# Patient Record
Sex: FEMALE | Race: WHITE | NOT HISPANIC OR LATINO | Employment: FULL TIME | URBAN - METROPOLITAN AREA
[De-identification: names, ages, dates, MRNs, and addresses within clinical notes are randomized per-mention and may not be internally consistent; named-entity substitution may affect disease eponyms.]

---

## 2017-02-21 ENCOUNTER — HOSPITAL ENCOUNTER (OUTPATIENT)
Dept: MAMMOGRAPHY | Facility: CLINIC | Age: 42
Discharge: HOME | End: 2017-02-21
Attending: OBSTETRICS & GYNECOLOGY | Admitting: OBSTETRICS & GYNECOLOGY
Payer: COMMERCIAL

## 2017-02-21 DIAGNOSIS — Z12.31 VISIT FOR SCREENING MAMMOGRAM: ICD-10-CM

## 2017-02-21 PROCEDURE — G0202 SCR MAMMO BI INCL CAD: HCPCS

## 2017-04-02 ENCOUNTER — OFFICE VISIT (OUTPATIENT)
Dept: URGENT CARE | Facility: URGENT CARE | Age: 42
End: 2017-04-02
Payer: COMMERCIAL

## 2017-04-02 VITALS
HEART RATE: 72 BPM | WEIGHT: 170.1 LBS | OXYGEN SATURATION: 100 % | SYSTOLIC BLOOD PRESSURE: 116 MMHG | BODY MASS INDEX: 26.64 KG/M2 | DIASTOLIC BLOOD PRESSURE: 72 MMHG | TEMPERATURE: 97.5 F

## 2017-04-02 DIAGNOSIS — J02.9 ACUTE PHARYNGITIS, UNSPECIFIED: Primary | ICD-10-CM

## 2017-04-02 DIAGNOSIS — H10.9 CONJUNCTIVITIS, BACTERIAL: ICD-10-CM

## 2017-04-02 LAB
DEPRECATED S PYO AG THROAT QL EIA: NORMAL
MICRO REPORT STATUS: NORMAL
SPECIMEN SOURCE: NORMAL

## 2017-04-02 PROCEDURE — 87880 STREP A ASSAY W/OPTIC: CPT | Performed by: PHYSICIAN ASSISTANT

## 2017-04-02 PROCEDURE — 87081 CULTURE SCREEN ONLY: CPT | Performed by: PHYSICIAN ASSISTANT

## 2017-04-02 PROCEDURE — 99213 OFFICE O/P EST LOW 20 MIN: CPT | Performed by: PHYSICIAN ASSISTANT

## 2017-04-02 RX ORDER — TOBRAMYCIN 3 MG/ML
1 SOLUTION/ DROPS OPHTHALMIC 4 TIMES DAILY
COMMUNITY
End: 2018-04-19

## 2017-04-02 NOTE — NURSING NOTE
"Chief Complaint   Patient presents with     Conjunctivitis     pink eye follow up from [facility] clinic from 3/31/17 symptoms are worse and spread to both eye and now sore throat        Initial /72 (BP Location: Right arm, Cuff Size: Adult Regular)  Pulse 72  Temp 97.5  F (36.4  C)  Wt 170 lb 1.6 oz (77.2 kg)  SpO2 100%  Breastfeeding? No  BMI 26.64 kg/m2 Estimated body mass index is 26.64 kg/(m^2) as calculated from the following:    Height as of 4/1/16: 5' 7\" (1.702 m).    Weight as of this encounter: 170 lb 1.6 oz (77.2 kg).  Medication Reconciliation: complete   Holly Harden MA      "

## 2017-04-02 NOTE — PROGRESS NOTES
SUBJECTIVE:    Janeth Badillo is a 41 year old female with a chief complaint of sore throat.  Onset of symptoms was 1 week(s) ago.    Course of illness: sudden onset, still present and constant.  Severity moderate  Current and Associated symptoms: nasal congestion, rhinorrhea, cough , sore throat, headache, myalgias and coryza  Treatment measures tried include 3 day history of using topical tobramycin for conjunctivitis.  She is still having watery discharge and redness. Eye symptoms started 3/31 and are worse  Predisposing factors include HX of asthma and using topical antibiotic.    Past Medical History:   Diagnosis Date     Allergic state      Asthma      Cough, persistent     right sided rib pain due to cough     PVC's (premature ventricular contractions)      Current Outpatient Prescriptions   Medication Sig Dispense Refill     Coenzyme Q10 (CO Q 10 PO)        tobramycin (TOBREX) 0.3 % ophthalmic solution 1 drop 4 times daily       Prenatal Multivit-Min-Fe-FA (PRENATAL VITAMINS) 0.8 MG TABS Take 1 Dose by mouth daily 100 tablet 3     Cholecalciferol (VITAMIN D) 2000 UNITS tablet Take 2,000 Units by mouth daily 100 tablet 3     Calcium Carbonate-Vitamin D (CALCIUM + D PO) Take 1 tablet by mouth       ALBUTEROL 90 MCG/ACT IN AERS 1-2 puffs Q 4-6 hrs prn 1 11     Social History   Substance Use Topics     Smoking status: Never Smoker     Smokeless tobacco: Never Used     Alcohol use Yes      Comment: 1-2/WEEK       ROS:  Review of systems negative except as stated above.    OBJECTIVE:   /72 (BP Location: Right arm, Cuff Size: Adult Regular)  Pulse 72  Temp 97.5  F (36.4  C)  Wt 170 lb 1.6 oz (77.2 kg)  SpO2 100%  Breastfeeding? No  BMI 26.64 kg/m2  GENERAL APPEARANCE: healthy, alert and no distress  EYES: EOMI,  PERRL, conjunctiva erythematous bilaterally. No pre or post auricular lymphadenopathy  HENT: ear canals and TM's normal.  Nose normal.  Pharynx erythematous with some exudate noted.  NECK:  supple, non-tender to palpation, no adenopathy noted  RESP: lungs clear to auscultation - no rales, rhonchi or wheezes  CV: regular rates and rhythm, normal S1 S2, no murmur noted  ABDOMEN:  soft, nontender, no HSM or masses and bowel sounds normal  SKIN: no suspicious lesions or rashes    Rapid Strep test is negative; await throat culture results.    ASSESSMENT:   Acute pharyngitis, unspecified    PLAN:     Continue with topical eye antibiotic.   Symptomatic treat with gargles, lozenges, and OTC analgesic as needed.   Follow-up with primary clinic if not improving.  Indication for return was gone over and patient voiced understanding.

## 2017-04-04 LAB
BACTERIA SPEC CULT: NORMAL
MICRO REPORT STATUS: NORMAL
SPECIMEN SOURCE: NORMAL

## 2017-04-17 ENCOUNTER — TELEPHONE (OUTPATIENT)
Dept: FAMILY MEDICINE | Facility: CLINIC | Age: 42
End: 2017-04-17

## 2017-04-17 NOTE — LETTER
[facility]  [address] [place] [id number]  [phone]        April 17, 2017      Janeth Badillo  [address] [facility] [address]          Dear Janeth,    In order to ensure we are providing the best quality care, we have reviewed your chart and see that you are due for:    1. Pap smear  2. Asthma follow up    Please call the clinic at your earliest convenience to schedule an appointment.  If you have completed these please contact our office via phone or [email or web address] to update our records.  We would like to know the date (approximately month and year), the result, and ideally where the procedure was performed.    Thank you for trusting us with your health care.      Sincerely,    Care Team for LENORA Lozano

## 2017-04-17 NOTE — TELEPHONE ENCOUNTER
Panel Management Review      Patient has the following on her problem list:     Asthma review     ACT Total Scores 4/1/2016   ACT TOTAL SCORE (Goal Greater than or Equal to 20) 22   In the past 12 months, how many times did you visit the emergency room for your asthma without being admitted to the hospital? 0   In the past 12 months, how many times were you hospitalized overnight because of your asthma? 0      1. Is Asthma diagnosis on the Problem List? Yes    2. Is Asthma listed on Health Maintenance? Yes    3. Patient is due for:  ACT and AAP      Composite cancer screening  Chart review shows that this patient is due/due soon for the following Pap Smear  Summary:    Patient is due/failing the following:   AAP, ACT and PAP    Action needed:   Patient needs office visit for pap smear and asthma follow up.    Type of outreach:    Sent [employer] message. and Sent letter.    Questions for provider review:    None                                                                                                                                    Dutch Swanson MA     Chart routed to none.

## 2017-05-10 ENCOUNTER — APPOINTMENT (OUTPATIENT)
Dept: LAB | Facility: CLINIC | Age: 42
End: 2017-05-10
Payer: COMMERCIAL

## 2017-05-10 PROCEDURE — 84999 UNLISTED CHEMISTRY PROCEDURE: CPT | Performed by: OBSTETRICS & GYNECOLOGY

## 2017-05-10 PROCEDURE — 81229 CYTOG ALYS CHRML ABNR SNPCGH: CPT | Performed by: OBSTETRICS & GYNECOLOGY

## 2017-05-23 DIAGNOSIS — Z82.79 FAMILY HISTORY OF CONGENITAL OR GENETIC CONDITION: Primary | ICD-10-CM

## 2017-05-26 DIAGNOSIS — Z82.79 FAMILY HISTORY OF CONGENITAL OR GENETIC CONDITION: ICD-10-CM

## 2017-05-26 LAB — MISCELLANEOUS TEST: NORMAL

## 2017-05-26 PROCEDURE — 36415 COLL VENOUS BLD VENIPUNCTURE: CPT | Performed by: PEDIATRICS

## 2017-06-03 ENCOUNTER — HEALTH MAINTENANCE LETTER (OUTPATIENT)
Age: 42
End: 2017-06-03

## 2017-10-09 LAB
LOCATION PERFORMED: NORMAL
RESULT: NORMAL
SEND OUTS MISC TEST CODE: 561
SEND OUTS MISC TEST SPECIMEN: NORMAL
TEST NAME: NORMAL

## 2018-04-13 ENCOUNTER — HOSPITAL ENCOUNTER (OUTPATIENT)
Dept: MAMMOGRAPHY | Facility: CLINIC | Age: 43
Discharge: HOME | End: 2018-04-13
Attending: OBSTETRICS & GYNECOLOGY | Admitting: OBSTETRICS & GYNECOLOGY
Payer: COMMERCIAL

## 2018-04-13 DIAGNOSIS — Z12.31 VISIT FOR SCREENING MAMMOGRAM: ICD-10-CM

## 2018-04-13 PROCEDURE — 77063 BREAST TOMOSYNTHESIS BI: CPT

## 2018-04-18 ENCOUNTER — HOSPITAL ENCOUNTER (OUTPATIENT)
Dept: MAMMOGRAPHY | Facility: CLINIC | Age: 43
Discharge: HOME | End: 2018-04-18
Attending: OBSTETRICS & GYNECOLOGY | Admitting: OBSTETRICS & GYNECOLOGY
Payer: COMMERCIAL

## 2018-04-18 DIAGNOSIS — R92.8 ABNORMAL MAMMOGRAM: ICD-10-CM

## 2018-04-18 PROCEDURE — 76642 ULTRASOUND BREAST LIMITED: CPT | Mod: RT

## 2018-04-19 ENCOUNTER — HOSPITAL ENCOUNTER (OUTPATIENT)
Dept: MAMMOGRAPHY | Facility: CLINIC | Age: 43
End: 2018-04-19
Attending: OBSTETRICS & GYNECOLOGY
Payer: COMMERCIAL

## 2018-04-19 ENCOUNTER — HOSPITAL ENCOUNTER (OUTPATIENT)
Dept: MAMMOGRAPHY | Facility: CLINIC | Age: 43
Discharge: HOME | End: 2018-04-19
Attending: OBSTETRICS & GYNECOLOGY | Admitting: OBSTETRICS & GYNECOLOGY
Payer: COMMERCIAL

## 2018-04-19 DIAGNOSIS — R92.8 ABNORMAL MAMMOGRAM: ICD-10-CM

## 2018-04-19 PROCEDURE — 88305 TISSUE EXAM BY PATHOLOGIST: CPT | Performed by: RADIOLOGY

## 2018-04-19 PROCEDURE — 25000125 ZZHC RX 250: Performed by: OBSTETRICS & GYNECOLOGY

## 2018-04-19 PROCEDURE — 88305 TISSUE EXAM BY PATHOLOGIST: CPT | Mod: 26 | Performed by: RADIOLOGY

## 2018-04-19 PROCEDURE — 27210206 US BREAST BIOPSY CORE NEEDLE RIGHT

## 2018-04-19 PROCEDURE — 40000986 MA POST PROCEDURE RIGHT

## 2018-04-19 RX ADMIN — LIDOCAINE HYDROCHLORIDE 5 ML: 10 INJECTION, SOLUTION INFILTRATION; PERINEURAL at 13:26

## 2018-04-19 NOTE — DISCHARGE INSTRUCTIONS
After Your Breast Biopsy    Bleeding or bruising: Slight bruising is normal.  If you bleed through the bandage, put direct pressure on the breast.  If you are still bleeding after 20 minutes, call the doctor who ordered the exam.    Bandages: Keep your bandage in place until tomorrow morning.  Do not get it wet.  .  On the second day, cover it with a Band-Aid.    Activity: You may shower the morning after the exam.  No heavy activity (lifting, vacuuming) for 24 hours.    Discomfort: Wear your bra overnight to support the breast.  You may take Tylenol (acetaminophen) for pain.  If you had a stereotactic of MR-directed biopsy, you may take aspirin or ibuprofen (Advil, Motrin) the morning after your biopsy, unless your doctor tells you not to.    Infection: Infection is rare.  Symptoms include fever, redness, increasing pain and fluid draining from the biopsy site.  If you have any of these symptoms, please call the doctor who ordered your exam.    Results: Results may take up to three business days.  If you have not heard your results in three days, call the Breast Center Nurse at [phone] or [phone].  In rare cases, we may need to do another biopsy.    Call the doctor who ordered your exam if:    You have bleeding that lasts more than 20 minutes.    You have pain that cannot be controlled.    You have signs of infection (fever, redness, drainage or other signs).    You have not had your results within three days.    Nurse navigator: Our nurse navigator is here to answer your questions and help you set up future clinic visits.  Please call [phone].    Thank you for choosing [facility].  Please call us if you have questions or concerns about your biopsy.

## 2018-04-20 LAB — COPATH REPORT: NORMAL

## 2018-04-20 NOTE — PROGRESS NOTES
Attempted to reach Ms. Badillo ( NO answer) to give her the results of her 4/19/2018 Right Breast Biopsy ( Normal Breast Tissue).  Radiologist is recommending Annual Screening Mammogram.

## 2018-04-23 ENCOUNTER — TELEPHONE (OUTPATIENT)
Dept: MAMMOGRAPHY | Facility: CLINIC | Age: 43
End: 2018-04-23

## 2018-04-23 NOTE — TELEPHONE ENCOUNTER
After review by Breast Center Radiologist, Dr. Obi Steward, Ms. Badillo was called and given her 4/19/2018 Right Breast Biopsy results (Normal Breast Tissue) and recommended Follow up (Annual Screening).  Biopsy site without issues or concerns.  I encouraged her to perform monthly breast self exams and to contact her doctor with any further breast concerns.

## 2018-05-16 ENCOUNTER — SURGERY (OUTPATIENT)
Age: 43
End: 2018-05-16

## 2018-05-16 ENCOUNTER — HOSPITAL ENCOUNTER (OUTPATIENT)
Facility: CLINIC | Age: 43
Discharge: HOME | End: 2018-05-16
Attending: OBSTETRICS & GYNECOLOGY | Admitting: OBSTETRICS & GYNECOLOGY
Payer: COMMERCIAL

## 2018-05-16 VITALS
RESPIRATION RATE: 16 BRPM | WEIGHT: 160.4 LBS | HEART RATE: 77 BPM | OXYGEN SATURATION: 99 % | TEMPERATURE: 98.5 F | BODY MASS INDEX: 25.18 KG/M2 | HEIGHT: 67 IN | SYSTOLIC BLOOD PRESSURE: 122 MMHG | DIASTOLIC BLOOD PRESSURE: 80 MMHG

## 2018-05-16 PROBLEM — N92.0 MENORRHAGIA: Chronic | Status: ACTIVE | Noted: 2018-05-16

## 2018-05-16 LAB — B-HCG SERPL-ACNC: <1 IU/L (ref 0–5)

## 2018-05-16 PROCEDURE — 36415 COLL VENOUS BLD VENIPUNCTURE: CPT | Performed by: OBSTETRICS & GYNECOLOGY

## 2018-05-16 PROCEDURE — 84702 CHORIONIC GONADOTROPIN TEST: CPT | Performed by: OBSTETRICS & GYNECOLOGY

## 2018-05-16 PROCEDURE — 40000883 ZZH CANCELLED SURGERY UP TO 61-90 MINS: Performed by: OBSTETRICS & GYNECOLOGY

## 2018-05-16 RX ORDER — LEVALBUTEROL TARTRATE 45 UG/1
2 AEROSOL, METERED ORAL EVERY 4 HOURS PRN
COMMUNITY
End: 2022-08-10

## 2018-05-16 RX ORDER — LEVOCETIRIZINE DIHYDROCHLORIDE 5 MG/1
5 TABLET, FILM COATED ORAL EVERY EVENING
COMMUNITY
End: 2022-08-10

## 2018-05-16 NOTE — OR NURSING
Surgery cancelled per SHASHI, Dr. Sarabia due to heart history. She will follow up with her cardiologist. Dr. Guevara here and talking with patient also. Patient in preop for 1 hr 45 min.

## 2019-06-05 ENCOUNTER — HOSPITAL ENCOUNTER (OUTPATIENT)
Dept: MAMMOGRAPHY | Facility: CLINIC | Age: 44
Discharge: HOME | End: 2019-06-05
Attending: OBSTETRICS & GYNECOLOGY | Admitting: OBSTETRICS & GYNECOLOGY
Payer: COMMERCIAL

## 2019-06-05 DIAGNOSIS — Z12.31 VISIT FOR SCREENING MAMMOGRAM: ICD-10-CM

## 2019-06-05 PROCEDURE — 77063 BREAST TOMOSYNTHESIS BI: CPT

## 2019-09-29 ENCOUNTER — HEALTH MAINTENANCE LETTER (OUTPATIENT)
Age: 44
End: 2019-09-29

## 2020-01-07 ENCOUNTER — MEDICAL CORRESPONDENCE (OUTPATIENT)
Dept: HEALTH INFORMATION MANAGEMENT | Facility: CLINIC | Age: 45
End: 2020-01-07

## 2020-01-23 DIAGNOSIS — L65.9 BALDNESS: Primary | ICD-10-CM

## 2020-01-23 LAB
FERRITIN SERPL-MCNC: 55 NG/ML (ref 8–252)
FOLATE SERPL-MCNC: 16.8 NG/ML
TSH SERPL DL<=0.005 MIU/L-ACNC: 0.77 MU/L (ref 0.4–4)
VIT B12 SERPL-MCNC: 453 PG/ML (ref 193–986)

## 2020-01-23 PROCEDURE — 36415 COLL VENOUS BLD VENIPUNCTURE: CPT

## 2020-01-23 PROCEDURE — 84270 ASSAY OF SEX HORMONE GLOBUL: CPT

## 2020-01-23 PROCEDURE — 82607 VITAMIN B-12: CPT

## 2020-01-23 PROCEDURE — 82627 DEHYDROEPIANDROSTERONE: CPT

## 2020-01-23 PROCEDURE — 82728 ASSAY OF FERRITIN: CPT

## 2020-01-23 PROCEDURE — 84443 ASSAY THYROID STIM HORMONE: CPT

## 2020-01-23 PROCEDURE — 86038 ANTINUCLEAR ANTIBODIES: CPT

## 2020-01-23 PROCEDURE — 82746 ASSAY OF FOLIC ACID SERUM: CPT

## 2020-01-23 PROCEDURE — 86039 ANTINUCLEAR ANTIBODIES (ANA): CPT

## 2020-01-23 PROCEDURE — 84630 ASSAY OF ZINC: CPT

## 2020-01-23 PROCEDURE — 82306 VITAMIN D 25 HYDROXY: CPT

## 2020-01-23 PROCEDURE — 82157 ASSAY OF ANDROSTENEDIONE: CPT

## 2020-01-23 PROCEDURE — 84403 ASSAY OF TOTAL TESTOSTERONE: CPT

## 2020-01-24 LAB
ANA PAT SER IF-IMP: ABNORMAL
ANA SER QL IF: ABNORMAL
ANA TITR SER IF: ABNORMAL {TITER}
DEPRECATED CALCIDIOL+CALCIFEROL SERPL-MC: 53 UG/L (ref 20–75)
DHEA-S SERPL-MCNC: 78 UG/DL (ref 35–430)

## 2020-01-25 LAB
SHBG SERPL-SCNC: 334 NMOL/L (ref 30–135)
TESTOST FREE SERPL-MCNC: 0.02 NG/DL (ref 0.11–0.58)
TESTOST SERPL-MCNC: 13 NG/DL (ref 8–60)
ZINC SERPL-MCNC: 64.7 UG/DL (ref 60–120)

## 2020-01-26 LAB — ANDROST SERPL-MCNC: 0.3 NG/ML (ref 0.13–0.82)

## 2020-08-04 ENCOUNTER — HOSPITAL ENCOUNTER (OUTPATIENT)
Dept: MAMMOGRAPHY | Facility: CLINIC | Age: 45
Discharge: HOME | End: 2020-08-04
Attending: OBSTETRICS & GYNECOLOGY | Admitting: OBSTETRICS & GYNECOLOGY
Payer: COMMERCIAL

## 2020-08-04 DIAGNOSIS — Z12.31 VISIT FOR SCREENING MAMMOGRAM: ICD-10-CM

## 2020-08-04 PROCEDURE — 77067 SCR MAMMO BI INCL CAD: CPT

## 2021-01-14 ENCOUNTER — HEALTH MAINTENANCE LETTER (OUTPATIENT)
Age: 46
End: 2021-01-14

## 2021-05-10 ENCOUNTER — TRANSFERRED RECORDS (OUTPATIENT)
Dept: MULTI SPECIALTY CLINIC | Facility: CLINIC | Age: 46
End: 2021-05-10

## 2021-05-10 LAB
HPV ABSTRACT: NORMAL
PAP-ABSTRACT: NORMAL

## 2021-07-12 ENCOUNTER — TRANSFERRED RECORDS (OUTPATIENT)
Dept: HEALTH INFORMATION MANAGEMENT | Facility: CLINIC | Age: 46
End: 2021-07-12

## 2021-08-02 ENCOUNTER — TELEPHONE (OUTPATIENT)
Dept: CONSULT | Facility: CLINIC | Age: 46
End: 2021-08-02

## 2021-08-02 NOTE — TELEPHONE ENCOUNTER
I called Janeth to discuss a referral to genetics.  She was unavailable and a non-detailed VM was left.    Isadora Silva MS [facility]  Genetic Counselor  Email: [email or web address]  Phone: [phone]  Pager: [phone]

## 2021-08-03 ENCOUNTER — TELEPHONE (OUTPATIENT)
Dept: CONSULT | Facility: CLINIC | Age: 46
End: 2021-08-03

## 2021-08-06 ENCOUNTER — HOSPITAL ENCOUNTER (OUTPATIENT)
Dept: MAMMOGRAPHY | Facility: CLINIC | Age: 46
Discharge: HOME | End: 2021-08-06
Attending: OBSTETRICS & GYNECOLOGY | Admitting: OBSTETRICS & GYNECOLOGY
Payer: COMMERCIAL

## 2021-08-06 DIAGNOSIS — Z12.31 VISIT FOR SCREENING MAMMOGRAM: ICD-10-CM

## 2021-08-06 PROCEDURE — 77063 BREAST TOMOSYNTHESIS BI: CPT

## 2021-08-11 NOTE — TELEPHONE ENCOUNTER
2nd message left for patient to call me back directly to schedule GC/MD appt with Dr. Soto in [facility]. Will send MyChart message.

## 2021-08-12 NOTE — TELEPHONE ENCOUNTER
Appointment scheduled.     Future Appointments   Date Time Provider Department Center   9/27/2021  1:30 PM [facility]   9/27/2021  2:00 PM Shaylee Soto MD [facility]

## 2021-09-27 ENCOUNTER — OFFICE VISIT (OUTPATIENT)
Dept: PEDIATRIC CARDIOLOGY | Facility: CLINIC | Age: 46
End: 2021-09-27
Attending: GENETIC COUNSELOR, MS
Payer: COMMERCIAL

## 2021-09-27 ENCOUNTER — OFFICE VISIT (OUTPATIENT)
Dept: PEDIATRIC CARDIOLOGY | Facility: CLINIC | Age: 46
End: 2021-09-27
Attending: MEDICAL GENETICS
Payer: COMMERCIAL

## 2021-09-27 VITALS
DIASTOLIC BLOOD PRESSURE: 76 MMHG | BODY MASS INDEX: 27 KG/M2 | HEIGHT: 68 IN | WEIGHT: 178.13 LBS | SYSTOLIC BLOOD PRESSURE: 129 MMHG | HEART RATE: 84 BPM

## 2021-09-27 DIAGNOSIS — I42.2 FAMILIAL HYPERTROPHIC CARDIOMYOPATHY (H): Primary | ICD-10-CM

## 2021-09-27 PROCEDURE — 99417 PROLNG OP E/M EACH 15 MIN: CPT | Performed by: MEDICAL GENETICS

## 2021-09-27 PROCEDURE — G0463 HOSPITAL OUTPT CLINIC VISIT: HCPCS

## 2021-09-27 PROCEDURE — 96040 HC GENETIC COUNSELING, EACH 30 MINUTES: CPT | Performed by: GENETIC COUNSELOR, MS

## 2021-09-27 PROCEDURE — 99205 OFFICE O/P NEW HI 60 MIN: CPT | Performed by: MEDICAL GENETICS

## 2021-09-27 RX ORDER — DROSPIRENONE AND ETHINYL ESTRADIOL 0.03MG-3MG
1 KIT ORAL
COMMUNITY
Start: 2021-05-10 | End: 2022-08-10

## 2021-09-27 RX ORDER — KETOCONAZOLE 20 MG/ML
SHAMPOO TOPICAL
COMMUNITY
Start: 2021-02-20

## 2021-09-27 RX ORDER — ALBUTEROL SULFATE 90 UG/1
2 AEROSOL, METERED RESPIRATORY (INHALATION) PRN
COMMUNITY
Start: 2019-12-12

## 2021-09-27 RX ORDER — VERAPAMIL HYDROCHLORIDE 120 MG/1
120 TABLET, FILM COATED, EXTENDED RELEASE ORAL
COMMUNITY
Start: 2021-08-27 | End: 2023-11-17

## 2021-09-27 RX ORDER — MULTIVITAMIN WITH IRON
TABLET ORAL
COMMUNITY
End: 2022-08-10

## 2021-09-27 RX ORDER — ERGOCALCIFEROL 1.25 MG/1
CAPSULE, LIQUID FILLED ORAL
COMMUNITY
Start: 2021-02-20 | End: 2022-08-10

## 2021-09-27 RX ORDER — CETIRIZINE HYDROCHLORIDE 10 MG/1
10 TABLET ORAL
COMMUNITY

## 2021-09-27 ASSESSMENT — MIFFLIN-ST. JEOR: SCORE: 1488.25

## 2021-09-27 NOTE — LETTER
"  2021      RE: Janeth Badillo  [address] [facility] [address]           GENETICS CLINIC CONSULTATION     Name:  Janeth Badillo  :   1975  MRN:   [id number]  Date of service: Sep 27, 2021  Primary Care Provider: Shaji Guevara  Referring Provider: Shaji Guevara    Dear Dr. Shaji Guevara     We had the pleasure of seeing Janeth in Genetics Clinic today.     Reason for consultation:  A consultation in the [facility]etics Clinic was requested for Janeth, a 46 year old female, for evaluation of hypertrophic cardiomyopathy.     She also saw our genetic counselor at this visit.       History is obtained from Patient and electronic health record.    Assessment:    Ms. Janeth Badillo is a 46 year old female with hypertrophic cardiomyopathy. Family history is positive for other maternal family members with hypertrophic cardiomyopathy. Only one other family member has had genetic testing, results are not known (but \"positive\").     We discussed hypertrophic cardiomyopathy is genetically heterogeneous. The genetic heterogeneity makes it difficult to use phenotype as the sole criterion to select a definitive cause. Broad panel testing allows for an efficient evaluation of several potential genes based on a single clinical indication. Janeth has already completed a NGS panel (18 gene) testing previously. 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 We discussed in detail what a VUS means. The classification of genetic variants, based on the ACMG guidelines, is usually a five-tiered scheme which describes the quantity and quality of evidence needed to classify the variant as pathogenic, likely pathogenic, a variant of uncertain significance (VUS), likely benign, or benign. If the classification of the variant is as a VUS, it means that, at the time of interpretation, there was not sufficient evidence to determine if the variant is clearly related to disease or not.     We " discussed further investigation is recommended to determine the cause of HCM more definitely. One option could be to do targeted variant testing and ECHO for her parents/ other symptomatic and asymptomatic family members to see how this variant is segregating. Other option could be to do another HCM panel now. This is a reasonable approach as her previous testing was done almost 10 years ago. Genetics has evolved since and more genes (>30) are now known to be associated with HCM. In addition, it does not seem like del/ dup (CNV) testing was included in her prior testing. Thus, her prior testing can not be considered comprehensive test at this time. Sponsored genetic testing option also exists. This information was discussed in detail by the GC.     The rationale for a genetic evaluation is based on the goal of identifying a unifying diagnosis for a patient.  A definitive diagnosis facilitates acquisition of needed services and is helpful in many other ways for the family. Many families are greatly empowered by knowing the underlying cause of a relative s disorder. Depending on the etiology, associated medical risks may be identified that lead to screening and the potential for prevention of morbidity. Specific recurrence-risk counseling can be provided, and targeted testing of at-risk family members can be offered.    Janeth agrees it is reasonable to proceed with further genetic testing via NGS panel. Before this, we will like to obtain genetic testing records for her asymptomatic female cousin.     Once we have a more definitive genetic diagnosis for Janeth, cascade genetic testing for at risk family members will be recommended.     Plan:    1. Ordered at this visit:   No orders of the defined types were placed in this encounter.      2. Genetic testing: Prior-auth for NGS (sequencing+del/dup) cardiomyopathy panel.   3. Genetic counseling consultation with Kaci Goldstein MS, [facility] to obtain pedigree, provide  genetic counseling regarding hypertrophic cardiomyopathy and obtain consent for genetic testing  4. Follow up: Return for Follow up, with me; depending on results of genetic testing.    References:  https://www.ahajournals.org/doi/10.1161/CIRCGEN.119.205687  -----------------------------------    History of Present Illness:  Ms. Janeth Badillo is a 46 year old female with hypertrophic cardiomyopathy.     Janeth got genetic testing done in  after her mother and maternal cousin were diagnosed with HCM. She had genetic testing completed through [facility]. This included 18 genes associated with HCM. One ACTC1 variant was identified and classified as a variant of uncertain clinical significance. Our GC, Isadora Silva contacted the lab recently and the variant classification remains as is.     Was getting screening ECHO since . She has been followed by at [place]. She was diagnosed with HCM just about 4 years ago. She takes Verapamil.     Patient Active Problem List   Diagnosis     CARDIOVASCULAR SCREENING; LDL GOAL LESS THAN 160     Endometriosis     fetal VSD, antepartum     Placental abnormality in third trimester     Known or suspected fetal abnormality affecting management of mother     Abnormal findings on  screening     Chromosomal abnormality in fetus, affecting management of mother, antepartum     Encounter for triage in pregnant patient     fetal asymmetric IUGR, antepartum     S/P  section     Endometrial polyp     Status post hysteroscopic polypectomy     Mild intermittent asthma without complication     Menorrhagia--AND HX OF ANEMIA     ROS  General: Negative for unexpected weight changes, fatigue  Neuro: Negative for seizures, hypotonia  Eyes: Negative for vision problems, strabismus, eye surgery, cataract  ENT: Negative for swallowing problems, cleft lip/palate  Endocrine: Negative for diabetes, precocious puberty. Mild goiter- no  meds  Respiratory: Negative for  breathing problems, cough. Mild intermittent asthma  Cardiovascular: Negative for known heart defects, murmur  Gastrointestinal: Negative for diarrhea, constipation, vomiting  : saw OB for menorrhagia, endometriosis- s/p endometrial ablation  Musculoskeletal: Negative for joint hypermobility, swelling, pain, scoliosis  Skin: Saw rheum for a positive antinuclear antibody, in the setting of recent alopecia areata.  Hematology: Negative for excessive bleeding or bruising    Developmental/Educational History:  No delays  Completed high school and college.   Works at the [employer]    Past Medical History:  Past Medical History:   Diagnosis Date     Allergic state      Asthma      Cough, persistent     right sided rib pain due to cough     PVC's (premature ventricular contractions)      Ovarian cyst  Endometriosis  Menorrhagia     Past Surgical History:  Past Surgical History:   Procedure Laterality Date      SECTION        SECTION N/A 2014    Procedure:  SECTION;  Surgeon: Anita Blanc MD;  Location:  L+D     DILATION AND CURETTAGE, OPERATIVE HYSTEROSCOPY WITH MORCELLATOR, COMBINED N/A 2016    Procedure: COMBINED DILATION AND CURETTAGE, OPERATIVE HYSTEROSCOPY WITH MORCELLATOR;  Surgeon: Peterson Dickerson MD;  Location:  OR     EYE SURGERY       HC AMNIOCENTESIS DIAGNOSTIC  10/20/2014     HERNIA REPAIR      [facility]     LAPAROSCOPIC TUBAL DYE STUDY  2013    Procedure: LAPAROSCOPIC TUBAL DYE STUDY;  LAPAROSCOPIC BILATERAL TUBAL DYE STUDY;  Surgeon: Shaji Guevara MD;  Location: [facility]     LAPAROTOMY EXPLORATORY      for fertility       Medications:  Current Outpatient Medications   Medication Sig Dispense Refill     albuterol (PROAIR HFA/PROVENTIL HFA/VENTOLIN HFA) 108 (90 Base) MCG/ACT inhaler Inhale 2 puffs into the lungs as needed       Coenzyme Q10 (CO Q 10 PO)        drospirenone-ethinyl estradiol (RESHMA) 3-0.03 MG tablet Take 1 tablet by mouth       verapamil ER  (CALAN-SR) 120 MG CR tablet Take 120 mg by mouth       Calcium Carbonate-Vitamin D (CALCIUM + D PO) Take 1 tablet by mouth (Patient not taking: Reported on 9/27/2021)       Calcium Carbonate-Vitamin D (CALCIUM 500 + D PO) Takes 20,000 units per week (Patient not taking: Reported on 9/27/2021)       cetirizine (ZYRTEC) 10 MG tablet Take 10 mg by mouth       Cholecalciferol (VITAMIN D) 2000 UNITS tablet Take 2,000 Units by mouth daily (Patient not taking: Reported on 9/27/2021) 100 tablet 3     ketoconazole (NIZORAL) 2 % external shampoo WASH ENTIRE SCALP 2 3X WEEKLY FOR MAINTENANCE. LATHER AND LET SIT 3 5 MINUTES BEFORE RINSING.       levalbuterol (XOPENEX HFA) 45 MCG/ACT Inhaler Inhale 2 puffs into the lungs every 4 hours as needed for shortness of breath / dyspnea or wheezing       levocetirizine (XYZAL) 5 MG tablet Take 5 mg by mouth every evening       magnesium 250 MG tablet        vitamin D2 (ERGOCALCIFEROL) 96230 units (1250 mcg) capsule TAKE 1 CAPSULE BY MOUTH ONCE WEEKLY FOR 3 MONTHS.         Allergies:  Allergies   Allergen Reactions     Morphine Itching     Phenergan Fortis      Extreme anxiety     Diet:  Regular    Family History:    A detailed pedigree was obtained by the genetic counselor at the time of this appointment and is scanned into the electronic medical record. I personally reviewed and discussed the pedigree with the GC and the family and concur with the GC note. Please refer to the formal pedigree for full details.       14 y old daughter had a normal recent ECHO.     7 yo daughter has been seen in genetics/ metabolism at the [facility] by Dr. Driver, Dr. Adams, Dr. Robertson and Jacquelyn Ayoub [facility] for  hyperinsulinemic hypoglycemia and VACTERL work up (tracheoesophageal fistula and esophageal atresia, perimembranous VSD s/p patch closure, PFO, PDA, and a vertebral anomaly). Normal CGH and chromosomes. Trio Exome sequencing (completed in 2017 at [facility]) was negative (including ACMG secondary  "findings- included ACTC1 gene and colton). ACMG secondary findings reported are known or expected pathogenic variants in recommended genes.    Mother had HCM and passed away in her 70's. No genetic testing    Maternal aunt has HCM. No genetic testing. Lives in [place].     Female maternal cousin (in her 30's) has normal ECHO. But \"positive genetic testing\". She got a whole panel done, not targeted testing. She has two sons and one daughter who will get genetic testing. Lives in [place]    Male maternal cousin passed away from HCM at the age of 35 years. No genetic testing    Father is well and lives in [place]    Physical Examination:  Blood pressure 129/76, pulse 84, height 5' 7.8\" (172.2 cm), weight 178 lb 2.1 oz (80.8 kg), head circumference 59.6 cm (23.47\"), not currently breastfeeding.    Pictures taken during the visit: no     GENERAL: Healthy, alert and no distress  EYES: Eyes grossly normal to inspection.  No discharge or erythema, or obvious scleral/conjunctival abnormalities.  RESP: No audible wheeze, cough, or visible cyanosis.  No visible retractions or increased work of breathing.    SKIN: Visible skin clear. No significant rash, abnormal pigmentation or lesions.  NEURO: Cranial nerves grossly intact.  Mentation and speech appropriate for age.  PSYCH: Mentation appears normal, affect normal/bright, judgement and insight intact, normal speech and appearance well-groomed.    Genetic testing done to date:        Imaging/ procedure results:  ECHO 9/2020:  Final Impressions   1. Hypertrophic cardiomyopathy, obstructive Neutral septum, with midventricular obstruction.   2. Mid left ventricular maximal instantaneous Doppler gradient rest 12 mm Hg; Valsalva 70 mm   Hg  3. The thickest wall segment was the basal anterior septal segment and the anterior lateral   papillary muscle is also hypertrophied.   4. The septal contour was neutral.   5. The thickest segment was measured at 14 mm anterior basal segment.   6. Mitral " chordal systolic anterior motion no significant mitral regurgitation.   7. Compared to the report of 04/22/2019 the following changes have occurred: induced gradient   (Valsalva) was higher today.    EKG 9/2020  Normal sinus rhythm   Minimal voltage criteria for LVH, may be normal variant   T wave abnormality, consider inferior ischemia     Pelvic US: 5/2021  1. The endometrial stripe is of normal thickness with an irregular appearance, favored to represent postsurgical changes from prior endometrial ablation.   2. Small subserosal uterine fibroid. No conspicuous subendometrial fibroid.   3. 1.5 cm simple right ovarian cyst. No follow-up warranted.                  Thank you for allowing us to participate in the care of Janethjelani Badillo. Please do not hesitate to contact us with questions.    110 min spent on the date of the encounter in chart review, patient visit, review of tests, documentation and/or discussion with other providers about the issues documented above.         Shaylee Soto MD    Division of Genetics and Metabolism  Department of Pediatrics  [facility]    Appt     [phone]  Nurse   [phone]           Route to  Patient Care Team:  No Ref-Primary, Physician as PCP - Dominic Wade

## 2021-09-27 NOTE — PATIENT INSTRUCTIONS
Genetics  [facility]     Contact our nurse care coordinator Latonya WERNERN, RN, PHN at [phone] or send a [employer] message for any non-urgent general or medical questions.     If you had genetic testing and have further questions, please contact the genetic counselor:    Kaci Goldstein I Ph: [phone]    To schedule appointments:  Pediatric Call Center for [facility]: [phone]  Neuropsychology [phone]  Radiology/ Imaging/Echocardiogram: [phone]   Services:   [phone]     You should receive a phone call about your next appointment. If you do not receive this within two weeks of your visit, please call [phone].     If you have not already done so consider signing up for BOKU by speaking with the person at the  on your way out or go to [email or web address] to sign up online.     BOKU enables easy and confidential communication with your care team.

## 2021-09-27 NOTE — LETTER
9/27/2021      RE: Janeth Badillo  [address] [facility] [address]       Date of Service: September 27, 2021    Primary Provider: Dr. Shaji Guevara  Referring Provider: Dr. Shaji Guevara     Presenting Information:   Janeth Badillo (Sammi) is a 46-year-old female who is seen in [facility] for an initial evaluation with Dr. Soto due to her history of hypertrophic cardiomyopathy (HCM).  Vee's daughter was recently referred to our clinic for evaluation due to a family history of cardiomyopathy, and we had requested to see Vee first in order to better elucidate the family history and the genetic testing that has been done to date.    Vee is followed by cardiology at [facility] for hypertrophic cardiomyopathy.  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  In 2017, the ACTC1 variant was reclassified as a variant of uncertain significance based on 2015 ACMG variant interpretation guidelines.  We recently contacted [facility] and they confirmed that this specific variant remains a variant of uncertain significance based on current ACMG guidelines.    I met with Vee today per the request of Dr. Soto to obtain a family history, and to discuss the recommendation for further cardiomyopathy genetic testing.  Please refer to Dr. Soto's note for complete details of Vee's medical history and physical exam gathered at today's visit.     Medical History:    Hypertrophic cardiomyopathy    PVC's (premature ventricular contractions)    Asthma    Pertinent imaging:     ECHO, most recent on 09/29/2020: Final Impressions: 1) Hypertrophic cardiomyopathy, obstructive Neutral septum, with midventricular obstruction. 2) Mid left ventricular maximal instantaneous Doppler gradient rest 12 mm Hg; Valsalva 70 mm Hg.  Clip 80 & 81. 3) The thickest wall segment was the  "basal anterior septal segment and the anterior lateral papillary muscle is also hypertrophied. 4) The septal contour was neutral. 5) The thickest segment was measured at 14 mm anterior basal segment. 6) Mitral chordal systolic anterior motion no significant mitral regurgitation. 7) Compared to the report of 2019 the following changes have occurred: induced gradient (Valsalva) was higher today.    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  This variant was initially classified as pathogenic, and was then reclassified in 2017 as a variant of uncertain significance.      Family History:   A three generation pedigree was obtained today and scanned into the EMR.  The following information is significant:      Vee has two children.  Her oldest daughter, Alma, is 14 years of age and healthy.  She had a normal echocardiogram last month.  Vee's youngest daughter, Rashmi, is 6 years of age.  Rashmi has a history of hyperinsulinism, VACTERL, and mild dysmorphism.  She has had several echocardiograms due to \"holes in her heart\" that were surgically repaired.  Her last echo (reportedly normal) was in  at [facility].  Rashmi had trio whole exome sequencing in 2017 that returned as normal.      Vee has one brother who has a history of autism, cognitive disability, and type II diabetes.  He lives with their father in [place].  Vee does not believe that her brother has had an echocardiogram yet, nor has he had any genetic testing related to his neurodevelopmental delays.      Maternal family history: Vee's mother  at age 70.  She had a history of strokes, A-fib and a valve problem.  She had presumed HCM.  One of Vee's aunts, now in her 80's, has HCM, a history of strokes and had an ICD placed.  One of Vee's uncles  in his 60's and had presumed HCM.  He also had pancreatic cancer.  " This uncle had a son who  at age 35 from a sudden cardiac arrest, and was known to have had an enlarged heart.  He had presumed HCM.  This uncle's daughter had recent genetic testing and tested positive for a gene variant related to cardiomyopathy (Vee does not believe that it was the ACTC1 variant).  This cousin had a normal echocardiogram.  This cousin's children will be having testing for the gene variant.  Vee has already reached out to this cousin to try to obtain more information about her genetic testing and possibly obtain a copy of her genetic test report.  Vee's grandmother  in her 60's and had a history of strokes.      Paternal family history: Vee's father has a history of stomach cancer diagnosed in his mid 70's, as well as hypertension.  Vee has two paternal first cousins once removed who had stomach cancer at a younger age, including a male who  from cancer in his 30's-40's, and a female who was diagnosed in her 30's-40's.  Two of Vee's aunts  from breast cancer, one in her 50's and the other in her 60's-70's.  One of Vee's uncles  in childhood - unknown cause.       The family history is otherwise negative for reports of birth defects, intellectual disability, known genetic disorders, seizures, congenital vision and hearing loss, and recurrent pregnancy loss / stillbirth.      Vee's maternal ancestry is [place], [place] , [place] and [place].  Her paternal ancestry is [place]  and .  There is no known consanguinity in the family.    Discussion:   Vee Badillo is a 46-year-old-female with hypertrophic cardiomyopathy and more recently premature ventricular contractions.  Vee believes that she was the first person in the family to have genetic testing for cardiomyopathy in 2012.  We reviewed her prior genetic test results.  Testing was done at [facility] and involved analysis of 14 nuclear genes and 4 mitochondrial genes.  A heterozygous variant was  "identified in the ACTC1 gene that was initially classified as pathogenic.  In 2017 the variant was reclassified as a \"variant of uncertain significance\" based on 2015 ACMG variant interpretation guidelines, though the variant remains suspicious.  We reviewed the difference between variant classifications (pathogenic vs. variant of uncertain significance).      Based on Vee's personal and family history of HCM, her daughters are most likely at 50% risk for developing HCM.  At this point, it is difficult to know if Vee's HCM can be definitively attributed to the ACTC1 gene variant of uncertain significance.  Therefore, we would not necessarily recommended testing Vee's daughters for this \"uncertain\" gene variant.  If Vee's daughters tested positive for the ACTC1 gene variant, this would not necessarily confirm / guarantee that they are at risk for developing HCM.  Likewise, if her daughters tested negative for the variant, this would not necessarily rule out a risk for HCM.      Given the improvements in genetic testing technology as well as our knowledge of genetics over the past decade, it would be reasonable for eVe to consider further genetic testing in order to more accurately determine the cause of her HCM.  If a clear pathogenic gene variant was identified for Vee, then targeted variant testing would be available and informative for her daughters and other at risk relatives.  Current panels for cardiomyopathy offered by different labs involve analysis of ~ 100 genes, and therefore doing genetic testing now would be more comprehensive compared to 10 years ago.    Genetic Testing:  Vee expressed interest in pursing additional genetic testing for cardiomyopathy.  Current testing would involve analysis of many genes associated with nonsyndromic forms of cardiomyopathy, as well as \"syndromic\" cardiomyopathy conditions in which cardiomyopathy is a feature of the condition, and there may be additional " associated medical problems.     One option for completing this testing would be to order the sponsored gene panel through [facility] (Detect Cardiomyopathy and Arrhythmia).  [facility] offers this sponsored gene panel via their partnership with a pharmaceutical company that is trying to develop treatments for inherited cardiomyopathies.  This partnership allows the testing to be sent free of charge as the cost is covered by the pharmaceutical company doing research.  By participating in the sponsored testing program, the patient agrees to share their de-identified results with the pharmaceutical company for their continued research.  Participation in a sponsored genetic test may provide indirect benefit to patients if new therapies are developed as a result, but does involve some risk related to genetic privacy.  Patients can always decline to receive sponsored testing and they would still be eligible to receive the same or equivalent genetic testing through traditional (non-sponsored) approaches.  A non-sponsored approach would be to order a similar cardiomyopathy gene panel through [facility] or another lab, and the testing would be billed to insurance.  This option would not involve sharing of de-identified information with a pharmaceutical company.    Gene panel testing involves simultaneous analysis of a group of genes that are associated with particular symptoms or related disorders.  The lab will look through the DNA letters that make up the genes to determine if there are any errors in the sequence of the letters (misspellings), or if there are any missing or extra DNA letters.  These types of errors can disrupt a gene and cause it to not work properly.    We reviewed the benefits, limitations, and possible results from gene panel testing which can include:      Positive - a mutation(s) was identified that is known to be associated with an inherited cardiomyopathy, and is thought to explain Vee's  "features.  A positive result may provide more information on appropriate clinical management for Janeth, and may provide information on additional potential health risks associated with the diagnosis.        Negative/normal - no mutations were identified in the analyzed genes.  Genetic testing has limitations, and a negative result would not exclude a genetic cause for Vee's HCM.      Variant of uncertain significance (VUS) - a change in the DNA sequence of a particular gene was identified, but there is not enough information to determine if the DNA change is disease-causing or benign.  It is unclear if the variant is contributing to Vee's HCM.  If a variant of uncertain significance is identified, testing of other relatives may be helpful to provide additional clarification.  In most cases, identification of a VUS does not result in any clinically actionable recommendations.    As mentioned above, Vee's maternal cousin had recent genetic testing for cardiomyopathy, and Vee believes that her cousin was found to have a different gene variant.  They are planning to test this cousin's children for the variant, which suggests that the variant may be pathogenic (targeted testing of relatives is usually recommended only when a gene variant is known to be pathogenic as opposed to \"uncertain significance\").  Vee reached out to her cousin today to obtain additional information, and she will try to obtain a copy of her genetic test report.  If we are able to determine which gene change was identified in Vee's cousin, we can ensure that this gene is included in the new cardiomyopathy panel that will be ordered for Vee.    Genetic test results will influence ongoing management and surveillance for Vee.  If a clearly pathogenic or likely pathogenic gene variant is identified that is associated with a specific inherited form of cardiomyopathy, this would provide information about future prognosis, including the " need to screen for additional medical problems that may be associated with that specific condition.  Genetic results may also help to inform treatment options, and help to clarify risks to other family members.  For these reasons, this recommended genetic testing for Vee is medically necessary.      Lab results may be automatically released via [facility].  Department protocol is to hold genetic testing results until we have reviewed them. We will then contact the family directly to disclose the results and ensure they receive a copy of the report. This protocol was reviewed with the family, who were in agreement to hold the results for genetics review and direct contact.      PLAN / SUMMARY:  1. Vee expressed interest in pursuing additional genetic testing for inherited cardiomyopathies.  She is specifically interested in the Detect Cardiomyopathy and Arrhythmia sponsored panel through [facility].  Prior to initiating this testing, Vee will try to obtain more information about her cousin's testing, as this may influence testing and results for Vee.  Vee provided written informed consent for this genetic testing.    2. Once we obtain records for the cousin and solidify the testing plan, we will ask the lab to mail a test kit to Vee for sample collection.  Once testing is initiated, results should be available in about 4 weeks and will be returned by phone.    3. Further follow up will depend on Vee's genetic test results.  If Vee is found to have a clear pathogenic or likely pathogenic gene variant, then we can coordinate targeted variant testing for Vee's daughters to help clarify their HCM risk.    4. Vee was provided with my contact information and encouraged to reach out with questions or concerns.       Kaci Goldstein MS, [facility]  Licensed Genetic Counselor  [facility], [place]  [phone]      Approximate Time Spent in Consultation: 45 minutes      Kaci Goldstein GC

## 2021-09-27 NOTE — PROGRESS NOTES
Date of Service: September 27, 2021    Primary Provider: Dr. Shaji Guevara  Referring Provider: Dr. Shaji Guevara     Presenting Information:   Janeth Badillo (Sammi) is a 46-year-old female who is seen in [facility] for an initial evaluation with Dr. Soto due to her history of hypertrophic cardiomyopathy (HCM).  Vee's daughter was recently referred to our clinic for evaluation due to a family history of cardiomyopathy, and we had requested to see Vee first in order to better elucidate the family history and the genetic testing that has been done to date.    Vee is followed by cardiology at [facility] for hypertrophic cardiomyopathy.  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  In 2017, the ACTC1 variant was reclassified as a variant of uncertain significance based on 2015 ACMG variant interpretation guidelines.  We recently contacted [facility] and they confirmed that this specific variant remains a variant of uncertain significance based on current ACMG guidelines.    I met with Vee today per the request of Dr. Soto to obtain a family history, and to discuss the recommendation for further cardiomyopathy genetic testing.  Please refer to Dr. Soto's note for complete details of Vee's medical history and physical exam gathered at today's visit.     Medical History:    Hypertrophic cardiomyopathy    PVC's (premature ventricular contractions)    Asthma    Pertinent imaging:     ECHO, most recent on 09/29/2020: Final Impressions: 1) Hypertrophic cardiomyopathy, obstructive Neutral septum, with midventricular obstruction. 2) Mid left ventricular maximal instantaneous Doppler gradient rest 12 mm Hg; Valsalva 70 mm Hg.  Clip 80 & 81. 3) The thickest wall segment was the basal anterior septal segment and the anterior lateral papillary muscle is also  "hypertrophied. 4) The septal contour was neutral. 5) The thickest segment was measured at 14 mm anterior basal segment. 6) Mitral chordal systolic anterior motion no significant mitral regurgitation. 7) Compared to the report of 2019 the following changes have occurred: induced gradient (Valsalva) was higher today.    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  This variant was initially classified as pathogenic, and was then reclassified in 2017 as a variant of uncertain significance.      Family History:   A three generation pedigree was obtained today and scanned into the EMR.  The following information is significant:      Vee has two children.  Her oldest daughter, Alma, is 14 years of age and healthy.  She had a normal echocardiogram last month.  Vee's youngest daughter, Rashmi, is 6 years of age.  Rashmi has a history of hyperinsulinism, VACTERL, and mild dysmorphism.  She has had several echocardiograms due to \"holes in her heart\" that were surgically repaired.  Her last echo (reportedly normal) was in February / 2020 at [facility].  Rashmi had trio whole exome sequencing in 2017 that returned as normal.      Vee has one brother who has a history of autism, cognitive disability, and type II diabetes.  He lives with their father in [place].  Vee does not believe that her brother has had an echocardiogram yet, nor has he had any genetic testing related to his neurodevelopmental delays.      Maternal family history: Vee's mother  at age 70.  She had a history of strokes, A-fib and a valve problem.  She had presumed HCM.  One of Vee's aunts, now in her 80's, has HCM, a history of strokes and had an ICD placed.  One of Vee's uncles  in his 60's and had presumed HCM.  He also had pancreatic cancer.  This uncle had a son who  at age 35 from a sudden cardiac arrest, and was known " to have had an enlarged heart.  He had presumed HCM.  This uncle's daughter had recent genetic testing and tested positive for a gene variant related to cardiomyopathy (Vee does not believe that it was the ACTC1 variant).  This cousin had a normal echocardiogram.  This cousin's children will be having testing for the gene variant.  Vee has already reached out to this cousin to try to obtain more information about her genetic testing and possibly obtain a copy of her genetic test report.  Vee's grandmother  in her 60's and had a history of strokes.      Paternal family history: Vee's father has a history of stomach cancer diagnosed in his mid 70's, as well as hypertension.  Vee has two paternal first cousins once removed who had stomach cancer at a younger age, including a male who  from cancer in his 30's-40's, and a female who was diagnosed in her 30's-40's.  Two of Vee's aunts  from breast cancer, one in her 50's and the other in her 60's-70's.  One of Vee's uncles  in childhood - unknown cause.       The family history is otherwise negative for reports of birth defects, intellectual disability, known genetic disorders, seizures, congenital vision and hearing loss, and recurrent pregnancy loss / stillbirth.      Vee's maternal ancestry is [place], [place] , [place] and [place].  Her paternal ancestry is [place]  and .  There is no known consanguinity in the family.    Discussion:   Vee Badillo is a 46-year-old-female with hypertrophic cardiomyopathy and more recently premature ventricular contractions.  Vee believes that she was the first person in the family to have genetic testing for cardiomyopathy in .  We reviewed her prior genetic test results.  Testing was done at [facility] and involved analysis of 14 nuclear genes and 4 mitochondrial genes.  A heterozygous variant was identified in the ACTC1 gene that was initially classified as pathogenic.  In 2017  "the variant was reclassified as a \"variant of uncertain significance\" based on 2015 ACMG variant interpretation guidelines, though the variant remains suspicious.  We reviewed the difference between variant classifications (pathogenic vs. variant of uncertain significance).      Based on Vee's personal and family history of HCM, her daughters are most likely at 50% risk for developing HCM.  At this point, it is difficult to know if Vee's HCM can be definitively attributed to the ACTC1 gene variant of uncertain significance.  Therefore, we would not necessarily recommended testing Vee's daughters for this \"uncertain\" gene variant.  If Vee's daughters tested positive for the ACTC1 gene variant, this would not necessarily confirm / guarantee that they are at risk for developing HCM.  Likewise, if her daughters tested negative for the variant, this would not necessarily rule out a risk for HCM.      Given the improvements in genetic testing technology as well as our knowledge of genetics over the past decade, it would be reasonable for Vee to consider further genetic testing in order to more accurately determine the cause of her HCM.  If a clear pathogenic gene variant was identified for Vee, then targeted variant testing would be available and informative for her daughters and other at risk relatives.  Current panels for cardiomyopathy offered by different labs involve analysis of ~ 100 genes, and therefore doing genetic testing now would be more comprehensive compared to 10 years ago.    Genetic Testing:  Vee expressed interest in pursing additional genetic testing for cardiomyopathy.  Current testing would involve analysis of many genes associated with nonsyndromic forms of cardiomyopathy, as well as \"syndromic\" cardiomyopathy conditions in which cardiomyopathy is a feature of the condition, and there may be additional associated medical problems.     One option for completing this testing would be to " order the sponsored gene panel through [employer] (Detect Cardiomyopathy and Arrhythmia).  [employer] offers this sponsored gene panel via their partnership with a pharmaceutical company that is trying to develop treatments for inherited cardiomyopathies.  This partnership allows the testing to be sent free of charge as the cost is covered by the pharmaceutical company doing research.  By participating in the sponsored testing program, the patient agrees to share their de-identified results with the pharmaceutical company for their continued research.  Participation in a sponsored genetic test may provide indirect benefit to patients if new therapies are developed as a result, but does involve some risk related to genetic privacy.  Patients can always decline to receive sponsored testing and they would still be eligible to receive the same or equivalent genetic testing through traditional (non-sponsored) approaches.  A non-sponsored approach would be to order a similar cardiomyopathy gene panel through [employer] [facility] [facility] or another lab, and the testing would be billed to insurance.  This option would not involve sharing of de-identified information with a pharmaceutical company.    Gene panel testing involves simultaneous analysis of a group of genes that are associated with particular symptoms or related disorders.  The lab will look through the DNA letters that make up the genes to determine if there are any errors in the sequence of the letters (misspellings), or if there are any missing or extra DNA letters.  These types of errors can disrupt a gene and cause it to not work properly.    We reviewed the benefits, limitations, and possible results from gene panel testing which can include:      Positive - a mutation(s) was identified that is known to be associated with an inherited cardiomyopathy, and is thought to explain Vee's features.  A positive result may provide more information on appropriate clinical  "management for Janeth, and may provide information on additional potential health risks associated with the diagnosis.        Negative/normal - no mutations were identified in the analyzed genes.  Genetic testing has limitations, and a negative result would not exclude a genetic cause for Vee's HCM.      Variant of uncertain significance (VUS) - a change in the DNA sequence of a particular gene was identified, but there is not enough information to determine if the DNA change is disease-causing or benign.  It is unclear if the variant is contributing to Vee's HCM.  If a variant of uncertain significance is identified, testing of other relatives may be helpful to provide additional clarification.  In most cases, identification of a VUS does not result in any clinically actionable recommendations.    As mentioned above, Vee's maternal cousin had recent genetic testing for cardiomyopathy, and Vee believes that her cousin was found to have a different gene variant.  They are planning to test this cousin's children for the variant, which suggests that the variant may be pathogenic (targeted testing of relatives is usually recommended only when a gene variant is known to be pathogenic as opposed to \"uncertain significance\").  Vee reached out to her cousin today to obtain additional information, and she will try to obtain a copy of her genetic test report.  If we are able to determine which gene change was identified in Vee's cousin, we can ensure that this gene is included in the new cardiomyopathy panel that will be ordered for Vee.    Genetic test results will influence ongoing management and surveillance for Vee.  If a clearly pathogenic or likely pathogenic gene variant is identified that is associated with a specific inherited form of cardiomyopathy, this would provide information about future prognosis, including the need to screen for additional medical problems that may be associated with that " specific condition.  Genetic results may also help to inform treatment options, and help to clarify risks to other family members.  For these reasons, this recommended genetic testing for Vee is medically necessary.      Lab results may be automatically released via [employer].  Department protocol is to hold genetic testing results until we have reviewed them. We will then contact the family directly to disclose the results and ensure they receive a copy of the report. This protocol was reviewed with the family, who were in agreement to hold the results for genetics review and direct contact.      PLAN / SUMMARY:  1. Vee expressed interest in pursuing additional genetic testing for inherited cardiomyopathies.  She is specifically interested in the Detect Cardiomyopathy and Arrhythmia sponsored panel through [employer].  Prior to initiating this testing, Vee will try to obtain more information about her cousin's testing, as this may influence testing and results for Vee.  Vee provided written informed consent for this genetic testing.    2. Once we obtain records for the cousin and solidify the testing plan, we will ask the lab to mail a test kit to Vee for sample collection.  Once testing is initiated, results should be available in about 4 weeks and will be returned by phone.    3. Further follow up will depend on Vee's genetic test results.  If Vee is found to have a clear pathogenic or likely pathogenic gene variant, then we can coordinate targeted variant testing for Vee's daughters to help clarify their HCM risk.    4. Vee was provided with my contact information and encouraged to reach out with questions or concerns.       Kaci Goldstein MS, [facility]  Licensed Genetic Counselor  [facility]  [phone]      Approximate Time Spent in Consultation: 45 minutes

## 2021-09-27 NOTE — PROGRESS NOTES
"    GENETICS CLINIC CONSULTATION     Name:  Janeth Badillo  :   1975  MRN:   [id number]  Date of service: Sep 27, 2021  Primary Care Provider: Shaji Guevara  Referring Provider: Shaji Guevara    Dear Dr. Shaji Guevara     We had the pleasure of seeing Janeth in Genetics Clinic today.     Reason for consultation:  A consultation in the [facility] was requested for Janeth, a 46 year old female, for evaluation of hypertrophic cardiomyopathy.     She also saw our genetic counselor at this visit.       History is obtained from Patient and electronic health record.    Assessment:    Ms. Janeth Badillo is a 46 year old female with hypertrophic cardiomyopathy. Family history is positive for other maternal family members with hypertrophic cardiomyopathy. Only one other family member has had genetic testing, results are not known (but \"positive\").     We discussed hypertrophic cardiomyopathy is genetically heterogeneous. The genetic heterogeneity makes it difficult to use phenotype as the sole criterion to select a definitive cause. Broad panel testing allows for an efficient evaluation of several potential genes based on a single clinical indication. Janeth has already completed a NGS panel (18 gene) testing previously. 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 We discussed in detail what a VUS means. The classification of genetic variants, based on the ACMG guidelines, is usually a five-tiered scheme which describes the quantity and quality of evidence needed to classify the variant as pathogenic, likely pathogenic, a variant of uncertain significance (VUS), likely benign, or benign. If the classification of the variant is as a VUS, it means that, at the time of interpretation, there was not sufficient evidence to determine if the variant is clearly related to disease or not.     We discussed further investigation is recommended to determine the cause of HCM more definitely. " One option could be to do targeted variant testing and ECHO for her parents/ other symptomatic and asymptomatic family members to see how this variant is segregating. Other option could be to do another HCM panel now. This is a reasonable approach as her previous testing was done almost 10 years ago. Genetics has evolved since and more genes (>30) are now known to be associated with HCM. In addition, it does not seem like del/ dup (CNV) testing was included in her prior testing. Thus, her prior testing can not be considered comprehensive test at this time. Sponsored genetic testing option also exists. This information was discussed in detail by the GC.     The rationale for a genetic evaluation is based on the goal of identifying a unifying diagnosis for a patient.  A definitive diagnosis facilitates acquisition of needed services and is helpful in many other ways for the family. Many families are greatly empowered by knowing the underlying cause of a relative s disorder. Depending on the etiology, associated medical risks may be identified that lead to screening and the potential for prevention of morbidity. Specific recurrence-risk counseling can be provided, and targeted testing of at-risk family members can be offered.    Janeth agrees it is reasonable to proceed with further genetic testing via NGS panel. Before this, we will like to obtain genetic testing records for her asymptomatic female cousin.     Once we have a more definitive genetic diagnosis for Janeth, cascade genetic testing for at risk family members will be recommended.     Plan:    1. Ordered at this visit:   No orders of the defined types were placed in this encounter.      2. Genetic testing: Prior-auth for NGS (sequencing+del/dup) cardiomyopathy panel.   3. Genetic counseling consultation with Kaci Goldstein, MS, [facility] to obtain pedigree, provide genetic counseling regarding hypertrophic cardiomyopathy and obtain consent for genetic  testing  4. Follow up: Return for Follow up, with me; depending on results of genetic testing.    References:  https://www.ahajournals.org/doi/10.1161/CIRCGEN.[email or web address]  -----------------------------------    History of Present Illness:  Ms. Janeth Badillo is a 46 year old female with hypertrophic cardiomyopathy.     Janeth got genetic testing done in  after her mother and maternal cousin were diagnosed with HCM. She had genetic testing completed through [facility]. This included 18 genes associated with HCM. One ACTC1 variant was identified and classified as a variant of uncertain clinical significance. Our GC, Isadora Silva contacted the lab recently and the variant classification remains as is.     Was getting screening ECHO since . She has been followed by at [place] cardiology. She was diagnosed with HCM just about 4 years ago. She takes Verapamil.     Patient Active Problem List   Diagnosis     CARDIOVASCULAR SCREENING; LDL GOAL LESS THAN 160     Endometriosis     fetal VSD, antepartum     Placental abnormality in third trimester     Known or suspected fetal abnormality affecting management of mother     Abnormal findings on  screening     Chromosomal abnormality in fetus, affecting management of mother, antepartum     Encounter for triage in pregnant patient     fetal asymmetric IUGR, antepartum     S/P  section     Endometrial polyp     Status post hysteroscopic polypectomy     Mild intermittent asthma without complication     Menorrhagia--AND HX OF ANEMIA     ROS  General: Negative for unexpected weight changes, fatigue  Neuro: Negative for seizures, hypotonia  Eyes: Negative for vision problems, strabismus, eye surgery, cataract  ENT: Negative for swallowing problems, cleft lip/palate  Endocrine: Negative for diabetes, precocious puberty. Mild goiter- no  meds  Respiratory: Negative for breathing problems, cough. Mild intermittent asthma  Cardiovascular: Negative for known  heart defects, murmur  Gastrointestinal: Negative for diarrhea, constipation, vomiting  : saw OB for menorrhagia, endometriosis- s/p endometrial ablation  Musculoskeletal: Negative for joint hypermobility, swelling, pain, scoliosis  Skin: Saw rheum for a positive antinuclear antibody, in the setting of recent alopecia areata.  Hematology: Negative for excessive bleeding or bruising    Developmental/Educational History:  No delays  Completed high school and college.   Works at the [employer]    Past Medical History:  Past Medical History:   Diagnosis Date     Allergic state      Asthma      Cough, persistent     right sided rib pain due to cough     PVC's (premature ventricular contractions)      Ovarian cyst  Endometriosis  Menorrhagia     Past Surgical History:  Past Surgical History:   Procedure Laterality Date      SECTION        SECTION N/A 2014    Procedure:  SECTION;  Surgeon: Anita Blanc MD;  Location:  L+D     DILATION AND CURETTAGE, OPERATIVE HYSTEROSCOPY WITH MORCELLATOR, COMBINED N/A 2016    Procedure: COMBINED DILATION AND CURETTAGE, OPERATIVE HYSTEROSCOPY WITH MORCELLATOR;  Surgeon: Peterson Dickerson MD;  Location:  OR     EYE SURGERY       HC AMNIOCENTESIS DIAGNOSTIC  10/20/2014     HERNIA REPAIR      [facility]     LAPAROSCOPIC TUBAL DYE STUDY  2013    Procedure: LAPAROSCOPIC TUBAL DYE STUDY;  LAPAROSCOPIC BILATERAL TUBAL DYE STUDY;  Surgeon: Shaji Guevara MD;  Location: [facility]     LAPAROTOMY EXPLORATORY      for fertility       Medications:  Current Outpatient Medications   Medication Sig Dispense Refill     albuterol (PROAIR HFA/PROVENTIL HFA/VENTOLIN HFA) 108 (90 Base) MCG/ACT inhaler Inhale 2 puffs into the lungs as needed       Coenzyme Q10 (CO Q 10 PO)        drospirenone-ethinyl estradiol (RESHMA) 3-0.03 MG tablet Take 1 tablet by mouth       verapamil ER (CALAN-SR) 120 MG CR tablet Take 120 mg by mouth       Calcium Carbonate-Vitamin D  (CALCIUM + D PO) Take 1 tablet by mouth (Patient not taking: Reported on 9/27/2021)       Calcium Carbonate-Vitamin D (CALCIUM 500 + D PO) Takes 20,000 units per week (Patient not taking: Reported on 9/27/2021)       cetirizine (ZYRTEC) 10 MG tablet Take 10 mg by mouth       Cholecalciferol (VITAMIN D) 2000 UNITS tablet Take 2,000 Units by mouth daily (Patient not taking: Reported on 9/27/2021) 100 tablet 3     ketoconazole (NIZORAL) 2 % external shampoo WASH ENTIRE SCALP 2 3X WEEKLY FOR MAINTENANCE. LATHER AND LET SIT 3 5 MINUTES BEFORE RINSING.       levalbuterol (XOPENEX HFA) 45 MCG/ACT Inhaler Inhale 2 puffs into the lungs every 4 hours as needed for shortness of breath / dyspnea or wheezing       levocetirizine (XYZAL) 5 MG tablet Take 5 mg by mouth every evening       magnesium 250 MG tablet        vitamin D2 (ERGOCALCIFEROL) 16972 units (1250 mcg) capsule TAKE 1 CAPSULE BY MOUTH ONCE WEEKLY FOR 3 MONTHS.         Allergies:  Allergies   Allergen Reactions     Morphine Itching     Phenergan Fortis      Extreme anxiety     Diet:  Regular    Family History:    A detailed pedigree was obtained by the genetic counselor at the time of this appointment and is scanned into the electronic medical record. I personally reviewed and discussed the pedigree with the GC and the family and concur with the GC note. Please refer to the formal pedigree for full details.       14 y old daughter had a normal recent ECHO.     7 yo daughter has been seen in genetics/ metabolism at the [facility] by Dr. Driver, Dr. Adams, Dr. Robertson and Jacquelyn Ayoub Located within [facility] for  hyperinsulinemic hypoglycemia and VACTERL work up (tracheoesophageal fistula and esophageal atresia, perimembranous VSD s/p patch closure, PFO, PDA, and a vertebral anomaly). Normal CGH and chromosomes. Trio Exome sequencing (completed in 2017 at [facility]) was negative (including ACMG secondary findings- included ACTC1 gene and colton). ACMG secondary findings reported are known  "or expected pathogenic variants in recommended genes.    Mother had HCM and passed away in her 70's. No genetic testing    Maternal aunt has HCM. No genetic testing. Lives in [place].     Female maternal cousin (in her 30's) has normal ECHO. But \"positive genetic testing\". She got a whole panel done, not targeted testing. She has two sons and one daughter who will get genetic testing. Lives in [place]    Male maternal cousin passed away from HCM at the age of 35 years. No genetic testing    Father is well and lives in [place]    Physical Examination:  Blood pressure 129/76, pulse 84, height 5' 7.8\" (172.2 cm), weight 178 lb 2.1 oz (80.8 kg), head circumference 59.6 cm (23.47\"), not currently breastfeeding.    Pictures taken during the visit: no     GENERAL: Healthy, alert and no distress  EYES: Eyes grossly normal to inspection.  No discharge or erythema, or obvious scleral/conjunctival abnormalities.  RESP: No audible wheeze, cough, or visible cyanosis.  No visible retractions or increased work of breathing.    SKIN: Visible skin clear. No significant rash, abnormal pigmentation or lesions.  NEURO: Cranial nerves grossly intact.  Mentation and speech appropriate for age.  PSYCH: Mentation appears normal, affect normal/bright, judgement and insight intact, normal speech and appearance well-groomed.    Genetic testing done to date:        Imaging/ procedure results:  ECHO 9/2020:  Final Impressions   1. Hypertrophic cardiomyopathy, obstructive Neutral septum, with midventricular obstruction.   2. Mid left ventricular maximal instantaneous Doppler gradient rest 12 mm Hg; Valsalva 70 mm   Hg  3. The thickest wall segment was the basal anterior septal segment and the anterior lateral   papillary muscle is also hypertrophied.   4. The septal contour was neutral.   5. The thickest segment was measured at 14 mm anterior basal segment.   6. Mitral chordal systolic anterior motion no significant mitral regurgitation.   7. Compared to " the report of 04/22/2019 the following changes have occurred: induced gradient   (Valsalva) was higher today.    EKG 9/2020  Normal sinus rhythm   Minimal voltage criteria for LVH, may be normal variant   T wave abnormality, consider inferior ischemia     Pelvic US: 5/2021  1. The endometrial stripe is of normal thickness with an irregular appearance, favored to represent postsurgical changes from prior endometrial ablation.   2. Small subserosal uterine fibroid. No conspicuous subendometrial fibroid.   3. 1.5 cm simple right ovarian cyst. No follow-up warranted.                  Thank you for allowing us to participate in the care of Janeth JOSUÉ Badillo. Please do not hesitate to contact us with questions.    110 min spent on the date of the encounter in chart review, patient visit, review of tests, documentation and/or discussion with other providers about the issues documented above.         Shaylee Soto MD    Division of Genetics and Metabolism  Department of Pediatrics  [facility]    Appt     [phone]  Nurse   [phone]           Route to  Patient Care Team:  No Ref-Primary, Physician as PCP - Dominic Wade

## 2021-09-27 NOTE — NURSING NOTE
"Chief Complaint   Patient presents with     Consult     redo genetic testing       /76 (BP Location: Left arm, Patient Position: Sitting, Cuff Size: Adult Regular)   Pulse 84   Ht 5' 7.8\" (172.2 cm)   Wt 178 lb 2.1 oz (80.8 kg)   HC 59.6 cm (23.47\")   BMI 27.25 kg/m      Jordy Toribio  September 27, 2021  "

## 2021-10-23 ENCOUNTER — HEALTH MAINTENANCE LETTER (OUTPATIENT)
Age: 46
End: 2021-10-23

## 2021-11-16 ENCOUNTER — OFFICE VISIT (OUTPATIENT)
Dept: DERMATOLOGY | Facility: CLINIC | Age: 46
End: 2021-11-16
Payer: COMMERCIAL

## 2021-11-16 DIAGNOSIS — Z00.6 RESEARCH SUBJECT: Primary | ICD-10-CM

## 2021-11-16 PROCEDURE — 99207 PR NO CHARGE LOS: CPT

## 2021-11-16 NOTE — PROGRESS NOTES
[facility]  Nucleotide Excision Repair (NER) In Human Populations Study   2021    Study Participant Name: Janeth Badillo   : 1975    Study Participant: # [id number]    Study Procedures Performed:  1. Patient consented and consent form (English) signed. Patient provided copy of consent form.   2. Patient provided $75 [employer]Card as compensation for study participation; W-9 form completed.   3. NER questionnaire completed  4. Two 2 mm punch biopsies performed (see procedure note below)  5. Blood draw performed  6. Parking voucher provided    Biopsy - Chronically Sun-Exposed Site (A): L dorsal hand  Punch biopsy:  After discussion of benefits and risks including but not limited to bleeding/bruising, pain/swelling, infection, scar, incomplete removal, nerve damage/numbness, recurrence, and non-diagnostic biopsy, written consent, verbal consent and photographs were obtained. Time-out was performed. The area was cleaned with isopropyl alcohol. 0.5mL of 1% lidocaine with epinephrine was injected to obtain adequate anesthesia of the lesion. A 2 mm punch biopsy was performed.  5-0 fast absorbing gut sutures were utilized to approximate the epidermal edges.  White petroleum jelly/VaselineTM and a bandage was applied to the wound.  Explicit verbal and written wound care instructions were provided.  The patient left the Dermatology Clinic in good condition.     Biopsy - Unexposed Site (B): L proximal inner arm  Punch biopsy:  After discussion of benefits and risks including but not limited to bleeding/bruising, pain/swelling, infection, scar, incomplete removal, nerve damage/numbness, recurrence, and non-diagnostic biopsy, written consent, verbal consent and photographs were obtained. Time-out was performed. The area was cleaned with isopropyl alcohol. 0.5mL of 1% lidocaine with epinephrine was injected to obtain adequate anesthesia of the lesion. A 2 mm punch biopsy was performed.  5-0 fast  absorbing gut sutures were utilized to approximate the epidermal edges.  White petroleum jelly/VaselineTM and a bandage was applied to the wound.  Explicit verbal and written wound care instructions were provided.  The patient left the Dermatology Clinic in good condition.    Staff Involved:  Jerardo Garcia MD - Research Fellow  Guille Escobar MD -     Guille Escobar MD  Pronouns: he/him/his    Department of Dermatology  [facility]: Phone: [phone], Fax:[phone]  [facility]: Phone: [phone] Fax: [phone]

## 2022-02-12 ENCOUNTER — HEALTH MAINTENANCE LETTER (OUTPATIENT)
Age: 47
End: 2022-02-12

## 2022-03-07 ENCOUNTER — OFFICE VISIT (OUTPATIENT)
Dept: PEDIATRIC CARDIOLOGY | Facility: CLINIC | Age: 47
End: 2022-03-07
Attending: MEDICAL GENETICS
Payer: COMMERCIAL

## 2022-03-07 ENCOUNTER — OFFICE VISIT (OUTPATIENT)
Dept: CONSULT | Facility: CLINIC | Age: 47
End: 2022-03-07
Attending: GENETIC COUNSELOR, MS
Payer: COMMERCIAL

## 2022-03-07 VITALS
WEIGHT: 173.28 LBS | BODY MASS INDEX: 27.2 KG/M2 | DIASTOLIC BLOOD PRESSURE: 76 MMHG | HEART RATE: 102 BPM | HEIGHT: 67 IN | SYSTOLIC BLOOD PRESSURE: 129 MMHG

## 2022-03-07 DIAGNOSIS — R89.8 ABNORMAL GENETIC TEST: ICD-10-CM

## 2022-03-07 DIAGNOSIS — I42.2 HYPERTROPHIC CARDIOMYOPATHY (H): Primary | ICD-10-CM

## 2022-03-07 DIAGNOSIS — I42.2 FAMILIAL HYPERTROPHIC CARDIOMYOPATHY (H): Primary | ICD-10-CM

## 2022-03-07 PROCEDURE — 999N000069 HC STATISTIC GENETIC COUNSELING, < 16 MIN: Performed by: GENETIC COUNSELOR, MS

## 2022-03-07 PROCEDURE — G0463 HOSPITAL OUTPT CLINIC VISIT: HCPCS

## 2022-03-07 PROCEDURE — 96040 PR GENETIC COUNSELING, EACH 30 MIN: CPT | Performed by: MEDICAL GENETICS

## 2022-03-07 PROCEDURE — 99417 PROLNG OP E/M EACH 15 MIN: CPT | Performed by: MEDICAL GENETICS

## 2022-03-07 PROCEDURE — 99215 OFFICE O/P EST HI 40 MIN: CPT | Performed by: MEDICAL GENETICS

## 2022-03-07 NOTE — LETTER
3/7/2022      RE: Janeth Badillo  [address] [address]       Date of Service: March 7, 2022     Referring Provider: Dr. Shaji Guevara      Presenting Information:   Janeth Badillo (Sammi) is a 46-year-old female with a personal and family history of hypertrophic cardiomyopathy (HCM).  She returns to Genetics Clinic for further review of her genetic test results, and to discuss targeted variant testing for her daughters.  Vee's daughter, Rashmi, also had an appointment today for follow up of her history of VACTERL association and prior negative genetic testing.    Vee is followed by cardiology at [facility] for hypertrophic cardiomyopathy.  She has several maternal relatives with a history of HCM.  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  In 2017, the ACTC1 variant was reclassified as a variant of uncertain significance based on 2015 ACMG variant interpretation guidelines.  At Vee's initial appointment with us on 09/27/2021, she elected to proceed with the sponsored [facility]ia [facility] Cardiomyopathy Comprehensive Panel.  The ACTC1 variant was confirmed and is still classified as a variant of uncertain significance.  Additional variants of uncertain significance were identified in the KRAS and MYH7 genes.  Vee has a maternal cousin who has HCM and has had her own genetic testing ([employer] cardiomyopathy gene panel in 2018).  This cousin was found to have the same ACTC1 variant, also classified as a variant of uncertain significance.  This cousin's 2-year-old daughter also has the ACTC1 variant and her echo showed bicuspid aortic valve.     Vee is interested in testing both of her daughters for the ACTC1 variant (and the KRAS variant).  Vee's oldest daughter is 14 years of age, is healthy, and has had a normal echocardiogram.  This  daughter is involved in sports and is interested in getting tested for the ACTC1 variant.  Vee's youngest daughter, Rashmi, has had several echocardiograms given her history of congenital heart defect, and her last echo was in January of 2021 and was normal s/p repair.  I met with Vee today per the request of Dr. Soto to further discuss targeted testing for her two daughters.    Medical History:    Hypertrophic cardiomyopathy    PVC's (premature ventricular contractions)    Asthma     Pertinent imaging:     ECHO, most recent on 09/29/2020: Final Impressions: 1) Hypertrophic cardiomyopathy, obstructive Neutral septum, with midventricular obstruction. 2) Mid left ventricular maximal instantaneous Doppler gradient rest 12 mm Hg; Valsalva 70 mm Hg.  Clip 80 & 81. 3) The thickest wall segment was the basal anterior septal segment and the anterior lateral papillary muscle is also hypertrophied. 4) The septal contour was neutral. 5) The thickest segment was measured at 14 mm anterior basal segment. 6) Mitral chordal systolic anterior motion no significant mitral regurgitation. 7) Compared to the report of 04/22/2019 the following changes have occurred: induced gradient (Valsalva) was higher today.     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  This variant was initially classified as pathogenic, and was then reclassified in 2017 as a variant of uncertain significance.      [facility], [place] [facility]rhythmia and Cardiomyopathy Comprehensive Panel, 11/03/2021: Confirmed the heterozygous variant of uncertain significance in the ACTC1 gene [c.268C>T (p.Civ21Bzw)].  In addition, a heterozygous variant of uncertain significance was identified in the KRAS gene [c.547_552del (p.[id number])] and the MYH7 gene [c.894G>A, Silent].       Family History:   A three generation pedigree was obtained at [facility]'s initial visit to Genetics Clinic  "on 09/27/2021.  Please see the scanned pedigree and genetic counseling note from that day for details.      Discussion:   Pathogenic variants in the ACTC1 gene are associated with various forms of cardiomyopathy including HCM.  We are hoping to further sort out the classification of the ACTC1 variant by working with Vee's cousin's genetic counselor and [facility] lab.  Although this ACTC1 variant is classified as having \"uncertain significance\", the variant is suspicious and it may be pathogenic (learning towards \"likely pathogenic\" per [employer] database).  Since this variant seems to be tracking with the HCM in the family, it is possible that this may provide additional evidence for [facility] lab to upgrade the classification of this variant from \"uncertain significance\" to \"likely pathogenic\".  In this scenario, targeted ACTC1 variant testing for Vee's daughters would be more informative, meaning that a positive result would most likely suggest an increased risk for developing HCM, and a negative result would suggest a low risk for developing HCM.  If the classification of the ACTC1 variant remains as \"uncertian significance\", it would be more challenging to make medical management recommendations based on unclear / uncertain genetic test results.  Based on Vee's personal and family history of HCM, her daughters are at 50% risk for developing HCM.  Further clarification of the ACTC1 variant may assist in further defining the risk to Vee's daughters and other relatives.    Pathogenic variants in the KRAS gene are associated with various [place] spectrum disorders / RASopathies, the features of which are highly variable and can include short stature, characteristic facies, congenital heart anomalies, and developmental delay.  Per Dr. Soto, Vee does not have any obvious features of a RASopathy, and therefore this KRAS variant may be less likely to be contributory.  Vee's cousin's testing did not " "include analysis of the KRAS gene.  It would be helpful to find out if Vee's cousin has this KRAS variant (if absent in the cousin, this would make it less likely to be associated with the HCM in the family).      Pathogenic variants in the MYH7 gene are also associated with various forms of cardiomyopathy including HCM.  The specific MYH7 variant identified for Vee is classified as \"likely benign\" by the [employer] database.  Vee's cousin's prior testing included the MYH7 gene and there is no mention of a variant in this gene on her test report.  Based on the available information, this MYH7 variant is most likely non-contributory / benign.    Vee's youngest daughter, Rashmi, had prior exome sequencing in 2017 through [facility] lab that was negative.  We had asked [facility] lab at that time to comment on whether Rashmi may have the familial ACTC1 variant, and they had informed us that the ACTC1 variant was absent for Rashmi.  We recently recommended exome re-analysis for Rashmi, and we will ask [facility] lab to evaluate for the familial ACTC1 and KRAS variants.  We will also arrange for targeted variant testing (ACTC1 and KRAS) for Vee's oldest daughter, Alma.  We will continue to work on connecting with Vee's cousin's genetic counselor and [facility] lab regarding re-classification of the ACTC1 variant.     Plan / Summary:  1. Reviewed Vee's most recent genetic test results.  2. Will try to connect with Vee's cousin's genetic counselor and [facility] lab to facilitate further evaluation / interpretation of the ACTC1 and KRAS variants.  3. Exome re-analysis was recommended for Vee's daughter Rashmi, and we have asked the lab to also evaluate for the ACTC1 and KRAS variants (see Rashmi's EMR for details regarding our visit with her today).  4. Will help to facilitate targeted ACTC1 and KRAS variant testing for Vee's daughter, Alma.  5. Further follow up pending above evaluations.        Kaci Goldstein MS, " C  Licensed Genetic Counselor  [facility], [place]  [phone]        Approximate Time Spent in Consultation: 15 minutes      Kaci Goldstein GC

## 2022-03-07 NOTE — PROGRESS NOTES
Date of Service: March 7, 2022     Referring Provider: Dr. Shaji Guevara      Presenting Information:   Janeth Badillo (Sammi) is a 46-year-old female with a personal and family history of hypertrophic cardiomyopathy (HCM).  She returns to Genetics Clinic for further review of her genetic test results, and to discuss targeted variant testing for her daughters.  Vee's daughter, Rashmi, also had an appointment today for follow up of her history of VACTERL association and prior negative genetic testing.    Vee is followed by cardiology at [facility] for hypertrophic cardiomyopathy.  She has several maternal relatives with a history of HCM.  Vee had prior genetic testing in 2012 through [facility] that had identified a variant in the ACTC1 gene (c.268 C>T, H90Y) that was initially classified as a pathogenic variant.  In 2017, the ACTC1 variant was reclassified as a variant of uncertain significance based on 2015 ACMG variant interpretation guidelines.  At Vee's initial appointment with us on 09/27/2021, she elected to proceed with the sponsored [facility] Comprehensive Panel.  The ACTC1 variant was confirmed and is still classified as a variant of uncertain significance.  Additional variants of uncertain significance were identified in the KRAS and MYH7 genes.  Vee has a maternal cousin who has HCM and has had her own genetic testing ([facility] cardiomyopathy gene panel in 2018).  This cousin was found to have the same ACTC1 variant, also classified as a variant of uncertain significance.  This cousin's 2-year-old daughter also has the ACTC1 variant and her echo showed bicuspid aortic valve.     Vee is interested in testing both of her daughters for the ACTC1 variant (and the KRAS variant).  Vee's oldest daughter is 14 years of age, is healthy, and has had a normal echocardiogram.  This daughter is involved in sports and is interested in getting tested for the ACTC1 variant.   Vee's youngest daughter, Rashmi, has had several echocardiograms given her history of congenital heart defect, and her last echo was in January of 2021 and was normal s/p repair.  I met with Vee today per the request of Dr. Soto to further discuss targeted testing for her two daughters.    Medical History:    Hypertrophic cardiomyopathy    PVC's (premature ventricular contractions)    Asthma     Pertinent imaging:     ECHO, most recent on 09/29/2020: Final Impressions: 1) Hypertrophic cardiomyopathy, obstructive Neutral septum, with midventricular obstruction. 2) Mid left ventricular maximal instantaneous Doppler gradient rest 12 mm Hg; Valsalva 70 mm Hg.  Clip 80 & 81. 3) The thickest wall segment was the basal anterior septal segment and the anterior lateral papillary muscle is also hypertrophied. 4) The septal contour was neutral. 5) The thickest segment was measured at 14 mm anterior basal segment. 6) Mitral chordal systolic anterior motion no significant mitral regurgitation. 7) Compared to the report of 04/22/2019 the following changes have occurred: induced gradient (Valsalva) was higher today.     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  This variant was initially classified as pathogenic, and was then reclassified in 2017 as a variant of uncertain significance.      [facility]ia [facility]omy[facility] Comprehensive Panel, 11/03/2021: Confirmed the heterozygous variant of uncertain significance in the ACTC1 gene [c.268C>T (p.Gxq36Gvm)].  In addition, a heterozygous variant of uncertain significance was identified in the KRAS gene [c.547_552del (p.Fje855_Qgd513vjy)] and the MYH7 gene [c.894G>A, Silent].       Family History:   A three generation pedigree was obtained at [facility]'s initial visit to Genetics Clinic on 09/27/2021.  Please see the scanned pedigree and genetic counseling note from that day  "for details.      Discussion:   Pathogenic variants in the ACTC1 gene are associated with various forms of cardiomyopathy including HCM.  We are hoping to further sort out the classification of the ACTC1 variant by working with Vee's cousin's genetic counselor and [facility] lab.  Although this ACTC1 variant is classified as having \"uncertain significance\", the variant is suspicious and it may be pathogenic (learning towards \"likely pathogenic\" per [employer] database).  Since this variant seems to be tracking with the HCM in the family, it is possible that this may provide additional evidence for [facility] to upgrade the classification of this variant from \"uncertain significance\" to \"likely pathogenic\".  In this scenario, targeted ACTC1 variant testing for Vee's daughters would be more informative, meaning that a positive result would most likely suggest an increased risk for developing HCM, and a negative result would suggest a low risk for developing HCM.  If the classification of the ACTC1 variant remains as \"uncertian significance\", it would be more challenging to make medical management recommendations based on unclear / uncertain genetic test results.  Based on Vee's personal and family history of HCM, her daughters are at 50% risk for developing HCM.  Further clarification of the ACTC1 variant may assist in further defining the risk to Vee's daughters and other relatives.    Pathogenic variants in the KRAS gene are associated with various Sodus spectrum disorders / RASopathies, the features of which are highly variable and can include short stature, characteristic facies, congenital heart anomalies, and developmental delay.  Per Dr. Soto, Vee does not have any obvious features of a RASopathy, and therefore this KRAS variant may be less likely to be contributory.  Vee's cousin's testing did not include analysis of the KRAS gene.  It would be helpful to find out if Vee's cousin has " "this KRAS variant (if absent in the cousin, this would make it less likely to be associated with the HCM in the family).      Pathogenic variants in the MYH7 gene are also associated with various forms of cardiomyopathy including HCM.  The specific MYH7 variant identified for Vee is classified as \"likely benign\" by the [employer] database.  Vee's cousin's prior testing included the MYH7 gene and there is no mention of a variant in this gene on her test report.  Based on the available information, this MYH7 variant is most likely non-contributory / benign.    Vee's youngest daughter, Rashmi, had prior exome sequencing in 2017 through [facility] that was negative.  We had asked [facility] at that time to comment on whether Rashmi may have the familial ACTC1 variant, and they had informed us that the ACTC1 variant was absent for Rashmi.  We recently recommended exome re-analysis for Rashmi, and we will ask [facility] to evaluate for the familial ACTC1 and KRAS variants.  We will also arrange for targeted variant testing (ACTC1 and KRAS) for Vee's oldest daughter, Alma.  We will continue to work on connecting with Vee's cousin's genetic counselor and [facility] regarding re-classification of the ACTC1 variant.     Plan / Summary:  1. Reviewed Vee's most recent genetic test results.  2. Will try to connect with Vee's cousin's genetic counselor and [facility] lab to facilitate further evaluation / interpretation of the ACTC1 and KRAS variants.  3. Exome re-analysis was recommended for Vee's daughter Rashmi, and we have asked the lab to also evaluate for the ACTC1 and KRAS variants (see Rashmi's EMR for details regarding our visit with her today).  4. Will help to facilitate targeted ACTC1 and KRAS variant testing for Vee's daughter, Alma.  5. Further follow up pending above evaluations.        Kaci Goldstein, MS, [facility]  Licensed Genetic Counselor  [facility], " [place]  [phone]        Approximate Time Spent in Consultation: 15 minutes

## 2022-03-07 NOTE — PROGRESS NOTES
GENETICS CLINIC FOLLOW UP    Name:  Janeth Badillo  :   1975  MRN:   [id number]  Date of service: Mar 7, 2022  Primary Care Provider: Shaji Guevara  Referring Provider: Shaji Guevara    Dear Dr. Shaji Guevara     We had the pleasure of seeing Janeth in Genetics Clinic today.     Reason for visit:  Discuss genetic testing results (cardiomyopathy panel)    She also saw our genetic counselor at this visit.       History is obtained from Patient and electronic health record.    Assessment:    Ms. Janeth Badillo is a 46 year old female with hypertrophic cardiomyopathy. Family history is positive for other maternal family members with hypertrophic cardiomyopathy (cousin and mother). Her affected female cousin has the same ACTC1 variant (but did not get tested for the KRAS gene; her panel testing did include the MYH7 gene).     Janeth recent genetic testing identified 3 VUS's of uncertain clinical significance.     1. A Variant of Uncertain Significance, c.268C>T (p.Msi44Qkn), was identified in ACTC1.  The ACTC1 gene is associated with autosomal dominant atrial septal defects (ASD) (MedGen UID: 792758), hypertrophic cardiomyopathy (HCM)  (MedGen UID: 668911), dilated cardiomyopathy (DCM) (MedGen UID: 352996) and left ventricular noncompaction (LVNC) (MedGen UID:  605799).  This variant qualifies for complimentary family studies at [facility].    2. A Variant of Uncertain Significance, c.547_552del (p.Ylt542_Fdc715xki), was identified in KRAS.  The KRAS gene is associated with autosomal dominant Yisel syndrome 3 (MedGen UID: 833054), cardio-aroldo-cutaneous 2 (CFC) syndrome  (MedGen UID: 632267), Gilliam syndrome (PMID: 89596217, 30905007) and mosaic RASopathy syndromes including oculoectodermal  syndrome (OES), encephalo?cranio?cutaneous lipomatosis (ECCL) and Schimmelpenning? Feuerstein?Luís syndrome (SFMS) (PMID: 68641968,  78539433).  This variant qualifies for complimentary family studies as  part of our VUS Resolution Program.  Janeth does not have apparent features suggestive of a RASopathy. Though milder cases are known and may be overlooked.     3. A Variant of Uncertain Significance, c.894G>A (Silent), was identified in MYH7.  The MYH7 gene is associated with autosomal dominant hypertrophic cardiomyopathy (HCM) (MedGen UID: 456988), dilated cardiomyopathy  (DCM) (MedGen UID: 33998), left ventricular noncompaction (LVNC) (MedGen UID: 840346), and Dianne distal myopathy (MPD1) (MedGen  UID: 172910). It is also associated with autosomal dominant and recessive myosin storage myopathy (MSMA) (MedGen UID:670077) and  autosomal dominant scapuloperoneal myopathy (SPMM) (MedGen UID: 627378).  This variant qualifies for complimentary family studies as part of our VUS Resolution Program.  This variant was absent from her affected female cousin  This variant does not seem to be the answer    Plan:    1. Ordered at this visit:   No orders of the defined types were placed in this encounter.      2. Recommend affected cousin gets KRAS testing to look for segregation as first step. We will establish communication with the genetic counselor of her female cousin after family gives consent  3. Recommend known familial mutation testing for her daughter (ACTC1 and KRAS)  4. Recommend exome re-analysis for her younger daughter who has diagnosis of VACTERL. We we will request the genetic testing lab to specifically look/commented on variations in the KRAS and ACTC1 gene.  5. Genetic counseling consultation with Kaci Goldstein MS, [facility] to obtain pedigree, provide genetic counseling regarding hypertrophic cardiomyopathy and obtain consent for genetic testing  6. Follow up: Return in about 1 year (around 3/7/2023) for Follow up, with me.    References:  https://www.ahajournals.org/doi/10.1161/CIRCGEN.[email or web address]  -----------------------------------    History of Present Illness:  Ms. Janeth Badillo is a 46 year old female  with hypertrophic cardiomyopathy.     Janeth got genetic testing done in  after her mother and maternal cousin were diagnosed with HCM. She had genetic testing completed through [facility]. This included 18 genes associated with HCM. One ACTC1 variant was identified and classified as a variant of uncertain clinical significance. Our GC, Isadora Silva contacted the lab recently and the variant classification remains as is.     Was getting screening ECHO since . She has been followed by at [place] cardi[facility]. She was diagnosed with HCM just about 4 years ago. She takes Verapamil.     Patient Active Problem List   Diagnosis     CARDIOVASCULAR SCREENING; LDL GOAL LESS THAN 160     Endometriosis     fetal VSD, antepartum     Placental abnormality in third trimester     Known or suspected fetal abnormality affecting management of mother     Abnormal findings on  screening     Chromosomal abnormality in fetus, affecting management of mother, antepartum     Encounter for triage in pregnant patient     fetal asymmetric IUGR, antepartum     S/P  section     Endometrial polyp     Status post hysteroscopic polypectomy     Mild intermittent asthma without complication     Menorrhagia--AND HX OF ANEMIA     Interim history:  Last seen in genetics clinic in 2021  We sent 100 gene cardiomyopathy panel through [facility] which revealed three VUS- ACTC1, KRAS and MYH7  Continues to follow with cardiology    ROS  General: Negative for unexpected weight changes, fatigue  Neuro: Negative for seizures, hypotonia. No prior history of intellectual disability/developmental delays  Eyes: Negative for vision problems, strabismus, eye surgery, cataract  ENT: Negative for swallowing problems, cleft lip/palate  Endocrine: Negative for diabetes, precocious puberty. Mild goiter- no meds. No history of short stature  Respiratory: Negative for breathing problems, cough. Mild intermittent asthma  Gastrointestinal:  Negative for diarrhea, constipation, vomiting  : saw OB for menorrhagia, endometriosis- s/p endometrial ablation. No known history of kidney anomalies  Musculoskeletal: Negative for joint hypermobility, swelling, pain, scoliosis  Skin: Saw rheum for a positive antinuclear antibody, in the setting of recent alopecia areata.  Hematology: Negative for excessive bleeding or bruising    Developmental/Educational History:  No delays  Completed high school and college.   Works at the IT    Past Medical History:  Past Medical History:   Diagnosis Date     Allergic state      Asthma      Cough, persistent     right sided rib pain due to cough     PVC's (premature ventricular contractions)      Ovarian cyst  Endometriosis  Menorrhagia     Past Surgical History:  Past Surgical History:   Procedure Laterality Date      SECTION        SECTION N/A 2014    Procedure:  SECTION;  Surgeon: Anita Blanc MD;  Location:  L+D     DILATION AND CURETTAGE, OPERATIVE HYSTEROSCOPY WITH MORCELLATOR, COMBINED N/A 2016    Procedure: COMBINED DILATION AND CURETTAGE, OPERATIVE HYSTEROSCOPY WITH MORCELLATOR;  Surgeon: Peterson Dickerson MD;  Location:  OR     EYE SURGERY       HC AMNIOCENTESIS DIAGNOSTIC  10/20/2014     HERNIA REPAIR      [facility]     LAPAROSCOPIC TUBAL DYE STUDY  2013    Procedure: LAPAROSCOPIC TUBAL DYE STUDY;  LAPAROSCOPIC BILATERAL TUBAL DYE STUDY;  Surgeon: Shaji Guevara MD;  Location: [facility]     LAPAROTOMY EXPLORATORY      for fertility       Medications:  Current Outpatient Medications   Medication Sig Dispense Refill     albuterol (PROAIR HFA/PROVENTIL HFA/VENTOLIN HFA) 108 (90 Base) MCG/ACT inhaler Inhale 2 puffs into the lungs as needed       Calcium Carbonate-Vitamin D (CALCIUM + D PO) Take 1 tablet by mouth (Patient not taking: Reported on 2021)       Calcium Carbonate-Vitamin D (CALCIUM 500 + D PO) Takes 20,000 units per week (Patient not taking:  Reported on 9/27/2021)       cetirizine (ZYRTEC) 10 MG tablet Take 10 mg by mouth       Cholecalciferol (VITAMIN D) 2000 UNITS tablet Take 2,000 Units by mouth daily (Patient not taking: Reported on 9/27/2021) 100 tablet 3     Coenzyme Q10 (CO Q 10 PO)        drospirenone-ethinyl estradiol (RESHMA) 3-0.03 MG tablet Take 1 tablet by mouth       ketoconazole (NIZORAL) 2 % external shampoo        levalbuterol (XOPENEX HFA) 45 MCG/ACT Inhaler Inhale 2 puffs into the lungs every 4 hours as needed for shortness of breath / dyspnea or wheezing       levocetirizine (XYZAL) 5 MG tablet Take 5 mg by mouth every evening       magnesium 250 MG tablet        verapamil ER (CALAN-SR) 120 MG CR tablet Take 120 mg by mouth       vitamin D2 (ERGOCALCIFEROL) 13902 units (1250 mcg) capsule TAKE 1 CAPSULE BY MOUTH ONCE WEEKLY FOR 3 MONTHS.         Allergies:  Allergies   Allergen Reactions     Morphine Itching     Phenergan Fortis      Extreme anxiety     Diet:  Regular    Family History:    A detailed pedigree was obtained by the genetic counselor at the time of this appointment and is scanned into the electronic medical record. I personally reviewed and discussed the pedigree with the GC and the family and concur with the GC note. Please refer to the formal pedigree for full details.       14 y old daughter had a normal recent ECHO. No genetic testing.    6 yo daughter (different father) has been seen in genetics/ metabolism at the [facility] by Dr. Driver, Dr. Adams, Dr. Robertson and Jacquelyn Ayoub, [facility] for  hyperinsulinemic hypoglycemia and VACTERL work up (tracheoesophageal fistula and esophageal atresia, perimembranous VSD s/p patch closure, PFO, PDA, and a vertebral anomaly). Normal CGH and chromosomes. Trio Exome sequencing (completed in 2017 at [facility]) was negative (including ACMG secondary findings- included ACTC1 gene and colton). ACMG secondary findings reported are known or expected pathogenic variants in recommended  "genes.    Mother had HCM and passed away in her 70's. No genetic testing    Maternal aunt has HCM. No genetic testing. Lives in [place].     Female maternal cousin (in her early 40'S) has confirmed HCM and has the same ACTC1 gene variant.  She got a whole panel done through [facility] in 2018, not targeted testing.       She has two sons and one daughter who will get genetic testing. Lived in [place] and now in [place].  The cousin's 2-year-old daughter also has the ACTC1 variant and her echo showed bicuspid aortic valve. Since tested negative      Male maternal cousin passed away from HCM at the age of 35 years. No genetic testing    Father is well and lives in [place]    Physical Examination:  Blood pressure 129/76, pulse 102, height 5' 7.44\" (171.3 cm), weight 173 lb 4.5 oz (78.6 kg), head circumference 57.7 cm (22.72\"), not currently breastfeeding.    Pictures taken during the visit: no     GENERAL: Healthy, alert and no distress  Head: Normocephalic, curly hair  EYES: Eyes grossly normal to inspection.  No discharge or erythema, or obvious scleral/conjunctival abnormalities.  RESP: No audible wheeze, cough, or visible cyanosis.  No visible retractions or increased work of breathing.    SKIN: Visible skin clear. No significant rash, abnormal pigmentation or lesions.  NEURO: Cranial nerves grossly intact.  Mentation and speech appropriate for age.  MSK: No brachydactyly, normal palmar creases  PSYCH: Mentation appears normal, affect normal/bright, judgement and insight intact, normal speech and appearance well-groomed.    Genetic testing done to date:        Next generation sequence analysis and deletion/duplication testing of the 100 genes  [facility]  Report date: 12/02/2021    1. ACTC1 c.268C>T (p.Thu60Wbn) heterozygous Uncertain Significance    ACTC1, Exon 3, c.268C>T (p.Yky45Sch), heterozygous, Uncertain Significance    This sequence change replaces histidine, which is basic and polar, with tyrosine, which is neutral and " polar, at codon 90 of the ACTC1 protein (p.Jiz25Qmq).    This variant is present in population databases ([id number], gnomAD 0.003%).    This missense change has been observed in individual(s) with left ventricular hypertrophy or hypertrophic cardiomyopathy (PMID: 41350774, 62081776, 99994922, 1975).    ClinVar contains an entry for this variant (Variation ID: [id number])-- VUS    Algorithms developed to predict the effect of missense changes on protein structure and function (SIFT, PolyPhen-2, Align-GVGD) all suggest that this variant is likely to be disruptive.    Experimental studies have shown that this missense change affects ACTC1 function (PMID: 37388745).    [employer] suggested variant classification is VUS (very close to LP)    2.   KRAS c.547_552del (p.[id number]) heterozygous Uncertain Significance    This variant, c.547_552del, results in the deletion of 2 amino acid(s) of the KRAS protein (p.Zvk871_Xip318ovp), but otherwise preserves the integrity of the reading frame.    This variant is present in population databases (no rsID available, gnomAD 0.006%).    This variant has been observed in individual(s) with cardio-aroldo-cutaneous syndrome (PMID: 48288736).    ClinVar contains an entry for this variant (Variation ID: [id number])- VUS    Experimental studies and prediction algorithms are not available or were not evaluated, and the functional significance of this variant is currently unknown.    Algorithms developed to predict the effect of sequence changes on RNA splicing suggest that this variant may create or strengthen a splice site.    3.   MYH7 c.894G>A (Silent) heterozygous Uncertain Significance    This sequence change affects codon 298 of the MYH7 mRNA. It is a 'silent' change, meaning that it does not change the encoded amino acid sequence of the MYH7 protein. It affects a nucleotide within the consensus splice site.    This variant is present in population databases ([id number],  gnomAD 0.02%).    This variant has not been reported in the literature in individuals affected with MYH7-related conditions.    ClinVar contains an entry for this variant (Variation ID: [id number]).    Algorithms developed to predict the effect of sequence changes on RNA splicing suggest that this variant is not likely to affect RNA splicing.    Venancio Sepulveda suggested variant classification is likely benign     Imaging/ procedure results:  ECHO 9/2020:  Final Impressions   1. Hypertrophic cardiomyopathy, obstructive Neutral septum, with midventricular obstruction.   2. Mid left ventricular maximal instantaneous Doppler gradient rest 12 mm Hg; Valsalva 70 mm   Hg  3. The thickest wall segment was the basal anterior septal segment and the anterior lateral   papillary muscle is also hypertrophied.   4. The septal contour was neutral.   5. The thickest segment was measured at 14 mm anterior basal segment.   6. Mitral chordal systolic anterior motion no significant mitral regurgitation.   7. Compared to the report of 04/22/2019 the following changes have occurred: induced gradient   (Valsalva) was higher today.    EKG 9/2020  Normal sinus rhythm   Minimal voltage criteria for LVH, may be normal variant   T wave abnormality, consider inferior ischemia     Pelvic US: 5/2021  1. The endometrial stripe is of normal thickness with an irregular appearance, favored to represent postsurgical changes from prior endometrial ablation.   2. Small subserosal uterine fibroid. No conspicuous subendometrial fibroid.   3. 1.5 cm simple right ovarian cyst. No follow-up warranted.                  Thank you for allowing us to participate in the care of Janeth JOSUÉ Dillardcamilapaco. Please do not hesitate to contact us with questions.    90 min spent on the date of the encounter in chart review, patient visit, review of tests, documentation and/or discussion with other providers about the issues documented above.         Shaylee Soto,  MD    Division of Genetics and Metabolism  Department of Pediatrics  [facility]    Appt     [phone]  Nurse   [phone]           Route to  Patient Care Team:  No Ref-Primary, Physician as PCP - Dominic Wade Anjali, MD as Assigned Pediatric Specialist Provider

## 2022-03-07 NOTE — NURSING NOTE
"Chief Complaint   Patient presents with     RECHECK     Follow up       /76 (BP Location: Right arm, Patient Position: Sitting, Cuff Size: Adult Regular)   Pulse 102   Ht 5' 7.44\" (171.3 cm)   Wt 173 lb 4.5 oz (78.6 kg)   HC 57.7 cm (22.72\")   BMI 26.79 kg/m      Jordy Toribio  March 7, 2022  "

## 2022-03-07 NOTE — PATIENT INSTRUCTIONS
Genetics  [facility]     Contact our nurse care coordinator Latonya WERNERN, RN, PHN at [phone] or send a [employer] message for any non-urgent general or medical questions.     If you had genetic testing and have further questions, please contact the genetic counselor:    Kaci Goldstein I Ph: [phone]    To schedule appointments:  Pediatric Call Center for [facility]: [phone]  Neuropsychology [phone]   Radiology/ Imaging/Echocardiogram: [phone]   Services:   [phone]     You should receive a phone call about your next appointment. If you do not receive this within two weeks of your visit, please call [phone].     IF REFERRALS WERE PLACED/ DISCUSSED DURING THE VISIT, PLEASE LET OUR TEAM KNOW IF YOU DO NOT HEAR FROM THE SCHEDULERS IN 2 WEEKS    If you have not already done so consider signing up for [employer] by speaking with the person at the  on your way out or go to [email or web address] to sign up online.     [employer] enables easy and confidential communication with your care team.

## 2022-03-07 NOTE — LETTER
3/7/2022      RE: Janeth Badillo  [address] [address]       GENETICS CLINIC FOLLOW UP    Name:  Janeth Badillo  :   1975  MRN:   [id number]  Date of service: Mar 7, 2022  Primary Care Provider: Shaji Guevara  Referring Provider: Shaji Guevara    Dear Dr. Shaji Guevara     We had the pleasure of seeing Janeth in Genetics Clinic today.     Reason for visit:  Discuss genetic testing results (cardiomyopathy panel)    She also saw our genetic counselor at this visit.       History is obtained from Patient and electronic health record.    Assessment:    Ms. Janeth Badillo is a 46 year old female with hypertrophic cardiomyopathy. Family history is positive for other maternal family members with hypertrophic cardiomyopathy (cousin and mother). Her affected female cousin has the same ACTC1 variant (but did not get tested for the KRAS gene; her panel testing did include the MYH7 gene).     Janeth recent genetic testing identified 3 VUS's of uncertain clinical significance.     1. A Variant of Uncertain Significance, c.268C>T (p.Ujb05Rpn), was identified in ACTC1.  The ACTC1 gene is associated with autosomal dominant atrial septal defects (ASD) (MedGen UID: 434160), hypertrophic cardiomyopathy (HCM)  (MedGen UID: 315935), dilated cardiomyopathy (DCM) (MedGen UID: 750752) and left ventricular noncompaction (LVNC) (MedGen UID:  388117).  This variant qualifies for complimentary family studies at [facility].    2. A Variant of Uncertain Significance, c.547_552del (p.Zed579_Cgh895xpp), was identified in KRAS.  The KRAS gene is associated with autosomal dominant [place] syndrome 3 (MedGen UID: 239399), cardio-aroldo-cutaneous 2 (CFC) syndrome  (MedGen UID: 579527), Gilliam syndrome (PMID: 28888332, 58232808) and mosaic RASopathy syndromes including oculoectodermal  syndrome (OES), encephalo?cranio?cutaneous lipomatosis (ECCL) and Schimmelpenning? Feuerstein?Luís syndrome (SFMS) (PMID:  45720778,  02848554).  This variant qualifies for complimentary family studies as part of our VUS Resolution Program.  Janeth does not have apparent features suggestive of a RASopathy. Though milder cases are known and may be overlooked.     3. A Variant of Uncertain Significance, c.894G>A (Silent), was identified in MYH7.  The MYH7 gene is associated with autosomal dominant hypertrophic cardiomyopathy (HCM) (MedGen UID: 173946), dilated cardiomyopathy  (DCM) (MedGen UID: 56301), left ventricular noncompaction (LVNC) (MedGen UID: 812949), and Dianne distal myopathy (MPD1) (MedGen  UID: 131263). It is also associated with autosomal dominant and recessive myosin storage myopathy (MSMA) (MedGen UID:013263) and  autosomal dominant scapuloperoneal myopathy (SPMM) (MedGen UID: 618847).  This variant qualifies for complimentary family studies as part of our VUS Resolution Program.  This variant was absent from her affected female cousin  This variant does not seem to be the answer    Plan:    1. Ordered at this visit:   No orders of the defined types were placed in this encounter.      2. Recommend affected cousin gets KRAS testing to look for segregation as first step. We will establish communication with the genetic counselor of her female cousin after family gives consent  3. Recommend known familial mutation testing for her daughter (ACTC1 and KRAS)  4. Recommend exome re-analysis for her younger daughter who has diagnosis of VACTERL. We we will request the genetic testing lab to specifically look/commented on variations in the KRAS and ACTC1 gene.  5. Genetic counseling consultation with Kaci Goldstein MS, [facility] to obtain pedigree, provide genetic counseling regarding hypertrophic cardiomyopathy and obtain consent for genetic testing  6. Follow up: Return in about 1 year (around 3/7/2023) for Follow up, with  me.    References:  https://www.ahajournals.org/doi/10.1161/CIRCGEN.[email or web address]  -----------------------------------    History of Present Illness:  Ms. Janeth Badillo is a 46 year old female with hypertrophic cardiomyopathy.     Janeth got genetic testing done in  after her mother and maternal cousin were diagnosed with HCM. She had genetic testing completed through [facility]. This included 18 genes associated with HCM. One ACTC1 variant was identified and classified as a variant of uncertain clinical significance. Our GC, Isadora Silva contacted the lab recently and the variant classification remains as is.     Was getting screening ECHO since . She has been followed by at [facility]. She was diagnosed with HCM just about 4 years ago. She takes Verapamil.     Patient Active Problem List   Diagnosis     CARDIOVASCULAR SCREENING; LDL GOAL LESS THAN 160     Endometriosis     fetal VSD, antepartum     Placental abnormality in third trimester     Known or suspected fetal abnormality affecting management of mother     Abnormal findings on  screening     Chromosomal abnormality in fetus, affecting management of mother, antepartum     Encounter for triage in pregnant patient     fetal asymmetric IUGR, antepartum     S/P  section     Endometrial polyp     Status post hysteroscopic polypectomy     Mild intermittent asthma without complication     Menorrhagia--AND HX OF ANEMIA     Interim history:  Last seen in genetics clinic in 2021  We sent 100 gene cardiomyopathy panel through [employer] which revealed three VUS- ACTC1, KRAS and MYH7  Continues to follow with cardiology    ROS  General: Negative for unexpected weight changes, fatigue  Neuro: Negative for seizures, hypotonia. No prior history of intellectual disability/developmental delays  Eyes: Negative for vision problems, strabismus, eye surgery, cataract  ENT: Negative for swallowing problems, cleft lip/palate  Endocrine:  Negative for diabetes, precocious puberty. Mild goiter- no meds. No history of short stature  Respiratory: Negative for breathing problems, cough. Mild intermittent asthma  Gastrointestinal: Negative for diarrhea, constipation, vomiting  : saw OB for menorrhagia, endometriosis- s/p endometrial ablation. No known history of kidney anomalies  Musculoskeletal: Negative for joint hypermobility, swelling, pain, scoliosis  Skin: Saw rheum for a positive antinuclear antibody, in the setting of recent alopecia areata.  Hematology: Negative for excessive bleeding or bruising    Developmental/Educational History:  No delays  Completed high school and college.   Works at the [employer]    Past Medical History:  Past Medical History:   Diagnosis Date     Allergic state      Asthma      Cough, persistent     right sided rib pain due to cough     PVC's (premature ventricular contractions)      Ovarian cyst  Endometriosis  Menorrhagia     Past Surgical History:  Past Surgical History:   Procedure Laterality Date      SECTION        SECTION N/A 2014    Procedure:  SECTION;  Surgeon: Anita Blanc MD;  Location:  L+D     DILATION AND CURETTAGE, OPERATIVE HYSTEROSCOPY WITH MORCELLATOR, COMBINED N/A 2016    Procedure: COMBINED DILATION AND CURETTAGE, OPERATIVE HYSTEROSCOPY WITH MORCELLATOR;  Surgeon: Peterson Dickerson MD;  Location:  OR     EYE SURGERY       HC AMNIOCENTESIS DIAGNOSTIC  10/20/2014     HERNIA REPAIR      [facility]     LAPAROSCOPIC TUBAL DYE STUDY  2013    Procedure: LAPAROSCOPIC TUBAL DYE STUDY;  LAPAROSCOPIC BILATERAL TUBAL DYE STUDY;  Surgeon: Shaji Guevara MD;  Location: [facility]     LAPAROTOMY EXPLORATORY      for fertility       Medications:  Current Outpatient Medications   Medication Sig Dispense Refill     albuterol (PROAIR HFA/PROVENTIL HFA/VENTOLIN HFA) 108 (90 Base) MCG/ACT inhaler Inhale 2 puffs into the lungs as needed       Calcium Carbonate-Vitamin D  (CALCIUM + D PO) Take 1 tablet by mouth (Patient not taking: Reported on 9/27/2021)       Calcium Carbonate-Vitamin D (CALCIUM 500 + D PO) Takes 20,000 units per week (Patient not taking: Reported on 9/27/2021)       cetirizine (ZYRTEC) 10 MG tablet Take 10 mg by mouth       Cholecalciferol (VITAMIN D) 2000 UNITS tablet Take 2,000 Units by mouth daily (Patient not taking: Reported on 9/27/2021) 100 tablet 3     Coenzyme Q10 (CO Q 10 PO)        drospirenone-ethinyl estradiol (RESHMA) 3-0.03 MG tablet Take 1 tablet by mouth       ketoconazole (NIZORAL) 2 % external shampoo        levalbuterol (XOPENEX HFA) 45 MCG/ACT Inhaler Inhale 2 puffs into the lungs every 4 hours as needed for shortness of breath / dyspnea or wheezing       levocetirizine (XYZAL) 5 MG tablet Take 5 mg by mouth every evening       magnesium 250 MG tablet        verapamil ER (CALAN-SR) 120 MG CR tablet Take 120 mg by mouth       vitamin D2 (ERGOCALCIFEROL) 09048 units (1250 mcg) capsule TAKE 1 CAPSULE BY MOUTH ONCE WEEKLY FOR 3 MONTHS.         Allergies:  Allergies   Allergen Reactions     Morphine Itching     Phenergan Fortis      Extreme anxiety     Diet:  Regular    Family History:    A detailed pedigree was obtained by the genetic counselor at the time of this appointment and is scanned into the electronic medical record. I personally reviewed and discussed the pedigree with the GC and the family and concur with the GC note. Please refer to the formal pedigree for full details.       14 y old daughter had a normal recent ECHO. No genetic testing.    6 yo daughter (different father) has been seen in genetics/ metabolism at the [facility] by Dr. Driver, Dr. Adams, Dr. Robertson and Jacquelyn Ayoub, [facility] for  hyperinsulinemic hypoglycemia and VACTERL work up (tracheoesophageal fistula and esophageal atresia, perimembranous VSD s/p patch closure, PFO, PDA, and a vertebral anomaly). Normal CGH and chromosomes. Trio Exome sequencing (completed in 2017 at  "[facility]) was negative (including ACMG secondary findings- included ACTC1 gene and colton). ACMG secondary findings reported are known or expected pathogenic variants in recommended genes.    Mother had HCM and passed away in her 70's. No genetic testing    Maternal aunt has HCM. No genetic testing. Lives in [place].     Female maternal cousin (in her early 40'S) has confirmed HCM and has the same ACTC1 gene variant.  She got a whole panel done through [facility] in 2018, not targeted testing.       She has two sons and one daughter who will get genetic testing. Lived in [place] and now in [place].  The cousin's 2-year-old daughter also has the ACTC1 variant and her echo showed bicuspid aortic valve. Since tested negative      Male maternal cousin passed away from HCM at the age of 35 years. No genetic testing    Father is well and lives in [place]    Physical Examination:  Blood pressure 129/76, pulse 102, height 5' 7.44\" (171.3 cm), weight 173 lb 4.5 oz (78.6 kg), head circumference 57.7 cm (22.72\"), not currently breastfeeding.    Pictures taken during the visit: no     GENERAL: Healthy, alert and no distress  Head: Normocephalic, curly hair  EYES: Eyes grossly normal to inspection.  No discharge or erythema, or obvious scleral/conjunctival abnormalities.  RESP: No audible wheeze, cough, or visible cyanosis.  No visible retractions or increased work of breathing.    SKIN: Visible skin clear. No significant rash, abnormal pigmentation or lesions.  NEURO: Cranial nerves grossly intact.  Mentation and speech appropriate for age.  MSK: No brachydactyly, normal palmar creases  PSYCH: Mentation appears normal, affect normal/bright, judgement and insight intact, normal speech and appearance well-groomed.    Genetic testing done to date:        Next generation sequence analysis and deletion/duplication testing of the 100 genes  [facility]  Report date: 12/02/2021    1. ACTC1 c.268C>T (p.Yir52Set) heterozygous Uncertain " Significance    ACTC1, Exon 3, c.268C>T (p.Yvi27Rft), heterozygous, Uncertain Significance    This sequence change replaces histidine, which is basic and polar, with tyrosine, which is neutral and polar, at codon 90 of the ACTC1 protein (p.Xig56Iuc).    This variant is present in population databases ([id number], gnomAD 0.003%).    This missense change has been observed in individual(s) with left ventricular hypertrophy or hypertrophic cardiomyopathy (PMID: 53262945, 94745897, 86686261, 15253756).    ClinVar contains an entry for this variant (Variation ID: [id number])-- VUS    Algorithms developed to predict the effect of missense changes on protein structure and function (SIFT, PolyPhen-2, Align-GVGD) all suggest that this variant is likely to be disruptive.    Experimental studies have shown that this missense change affects ACTC1 function (PMID: 97117672).    [employer] suggested variant classification is VUS (very close to LP)    2.   KRAS c.547_552del (p.Wip177_Zwc522vhb) heterozygous Uncertain Significance    This variant, c.547_552del, results in the deletion of 2 amino acid(s) of the KRAS protein (p.Qly654_Iol526fnf), but otherwise preserves the integrity of the reading frame.    This variant is present in population databases (no rsID available, gnomAD 0.006%).    This variant has been observed in individual(s) with cardio-aroldo-cutaneous syndrome (PMID: 41012901).    ClinVar contains an entry for this variant (Variation ID: [id number])- VUS    Experimental studies and prediction algorithms are not available or were not evaluated, and the functional significance of this variant is currently unknown.    Algorithms developed to predict the effect of sequence changes on RNA splicing suggest that this variant may create or strengthen a splice site.    3.   MYH7 c.894G>A (Silent) heterozygous Uncertain Significance    This sequence change affects codon 298 of the MYH7 mRNA. It is a 'silent' change, meaning that it  does not change the encoded amino acid sequence of the MYH7 protein. It affects a nucleotide within the consensus splice site.    This variant is present in population databases ([id number], gnomAD 0.02%).    This variant has not been reported in the literature in individuals affected with MYH7-related conditions.    ClinVar contains an entry for this variant (Variation ID: [id number]).    Algorithms developed to predict the effect of sequence changes on RNA splicing suggest that this variant is not likely to affect RNA splicing.    [employer]anc[employer] Sepulveda suggested variant classification is likely benign     Imaging/ procedure results:  ECHO 9/2020:  Final Impressions   1. Hypertrophic cardiomyopathy, obstructive Neutral septum, with midventricular obstruction.   2. Mid left ventricular maximal instantaneous Doppler gradient rest 12 mm Hg; Valsalva 70 mm   Hg  3. The thickest wall segment was the basal anterior septal segment and the anterior lateral   papillary muscle is also hypertrophied.   4. The septal contour was neutral.   5. The thickest segment was measured at 14 mm anterior basal segment.   6. Mitral chordal systolic anterior motion no significant mitral regurgitation.   7. Compared to the report of 04/22/2019 the following changes have occurred: induced gradient   (Valsalva) was higher today.    EKG 9/2020  Normal sinus rhythm   Minimal voltage criteria for LVH, may be normal variant   T wave abnormality, consider inferior ischemia     Pelvic US: 5/2021  1. The endometrial stripe is of normal thickness with an irregular appearance, favored to represent postsurgical changes from prior endometrial ablation.   2. Small subserosal uterine fibroid. No conspicuous subendometrial fibroid.   3. 1.5 cm simple right ovarian cyst. No follow-up warranted.                  Thank you for allowing us to participate in the care of Janeth Badillo. Please do not hesitate to contact us with questions.    90 min spent on the date  of the encounter in chart review, patient visit, review of tests, documentation and/or discussion with other providers about the issues documented above.         Shaylee Soto MD    Division of Genetics and Metabolism  Department of Pediatrics  [facility]    Appt     [phone]  Nurse   [phone]           Route to  Patient Care Team:  No Ref-Primary, Physician as PCP - Dominic Wade Anjali, MD as Assigned Pediatric Specialist Provider

## 2022-03-07 NOTE — LETTER
Date:March 14, 2022      Patient was self referred, no letter generated. Do not send.        [facility] Health Information

## 2022-08-10 ENCOUNTER — OFFICE VISIT (OUTPATIENT)
Dept: FAMILY MEDICINE | Facility: CLINIC | Age: 47
End: 2022-08-10
Payer: COMMERCIAL

## 2022-08-10 VITALS
SYSTOLIC BLOOD PRESSURE: 116 MMHG | RESPIRATION RATE: 16 BRPM | HEART RATE: 96 BPM | TEMPERATURE: 97.8 F | DIASTOLIC BLOOD PRESSURE: 79 MMHG | OXYGEN SATURATION: 99 % | WEIGHT: 162.6 LBS | BODY MASS INDEX: 25.13 KG/M2

## 2022-08-10 DIAGNOSIS — L03.116 CELLULITIS OF LEFT LEG: Primary | ICD-10-CM

## 2022-08-10 PROCEDURE — 99203 OFFICE O/P NEW LOW 30 MIN: CPT | Performed by: PHYSICIAN ASSISTANT

## 2022-08-10 RX ORDER — CEFADROXIL 500 MG/1
500 CAPSULE ORAL 2 TIMES DAILY
Qty: 20 CAPSULE | Refills: 0 | Status: SHIPPED | OUTPATIENT
Start: 2022-08-10 | End: 2022-08-20

## 2022-08-10 NOTE — PATIENT INSTRUCTIONS
Hot packs to the area 3 times per day.  Continue taking Zyrtec for itching.  Take the antibiotic as written.  Return to see your primary care provider return to walk-in care if not getting good resolution of new symptoms or concerns arise

## 2022-08-10 NOTE — PROGRESS NOTES
Patient presents with:  Insect Bites: X last weekend. Lt leg bad itching and swollen and tingly. No discharge.      Clinical Decision Making:  Patient is treated with Duricef for cellulitis.  Hot packs to the area 3 times per day.  Continuation with Zyrtec and topical hydrocortisone.  Follow-up with primary care provider if not getting good resolution of new symptoms or concerns arise.      ICD-10-CM    1. Cellulitis of left leg  L03.116 cefadroxil (DURICEF) 500 MG capsule       Patient Instructions   Hot packs to the area 3 times per day.  Continue taking Zyrtec for itching.  Take the antibiotic as written.  Return to see your primary care provider return to walk-in care if not getting good resolution of new symptoms or concerns arise          HPI:  Janeth Badillo is a 47 year old female who presents today for evaluation of possible insect bite to the left lower leg over the proximal medial aspect of the calf.  Patient shares she has had symptoms over the last 1 week.  There is been no stage I Lyme disease symptoms to include myalgias arthralgias skin rash anywhere else on the body fever chills night sweats stiff neck.  No known insect bite to include tick bite that was seen.  However, patient states has been very pruritic and has been using topical hydrocortisone over-the-counter and Zyrtec with some relief of the itching.    History obtained from chart review and the patient.    Problem List:  2018: Menorrhagia--AND HX OF ANEMIA  2016: Mild intermittent asthma without complication  2016: Endometrial polyp  2016: Status post hysteroscopic polypectomy  2014: S/P  section  2014: fetal asymmetric IUGR, antepartum  2014: Encounter for triage in pregnant patient  2014: Chromosomal abnormality in fetus, affecting management of   mother, antepartum  2014-10: Abnormal findings on  screening  2014-10: fetal VSD, antepartum  2014-10: Placental abnormality in third  trimester  2014-10: Known or suspected fetal abnormality affecting management of   mother  2013-11: Endometriosis  2012-11: CARDIOVASCULAR SCREENING; LDL GOAL LESS THAN 160      Past Medical History:   Diagnosis Date     Allergic state      Asthma      Cough, persistent     right sided rib pain due to cough     PVC's (premature ventricular contractions)        Social History     Tobacco Use     Smoking status: Never Smoker     Smokeless tobacco: Never Used   Substance Use Topics     Alcohol use: Yes     Comment: 1-2/WEEK       Review of Systems  As above in HPI otherwise negative.    Vitals:    08/10/22 1459   BP: 116/79   BP Location: Right arm   Patient Position: Sitting   Cuff Size: Adult Regular   Pulse: 96   Resp: 16   Temp: 97.8  F (36.6  C)   TempSrc: Oral   SpO2: 99%   Weight: 73.8 kg (162 lb 9.6 oz)       General: Patient is resting comfortably no acute distress is afebrile  HEENT: Head is normocephalic atraumatic   eyes are PERRL EOMI sclera anicteric   TMs are clear bilaterally  Throat is clear   no cervical lymphadenopathy present  LUNGS: Clear to auscultation bilaterally  HEART: Regular rate and rhythm  Skin: With area of erythema on the proximal portion of the medial calf.  Rash is roughly 4 cm in diameter.  It is not warm to touch or indurated.  No noted lymphangitic streaking.    Physical Exam    At the end of the encounter, I discussed results, diagnosis, medications. Discussed red flags for immediate return to clinic/ER, as well as indications for follow up if no improvement. Patient understood and agreed to plan. Patient was stable for discharge.

## 2022-08-26 ENCOUNTER — ANCILLARY PROCEDURE (OUTPATIENT)
Dept: MAMMOGRAPHY | Facility: CLINIC | Age: 47
End: 2022-08-26
Payer: COMMERCIAL

## 2022-08-26 ENCOUNTER — TRANSFERRED RECORDS (OUTPATIENT)
Dept: MULTI SPECIALTY CLINIC | Facility: CLINIC | Age: 47
End: 2022-08-26

## 2022-08-26 DIAGNOSIS — Z12.31 VISIT FOR SCREENING MAMMOGRAM: ICD-10-CM

## 2022-08-26 PROCEDURE — 77067 SCR MAMMO BI INCL CAD: CPT

## 2022-08-26 PROCEDURE — 88305 TISSUE EXAM BY PATHOLOGIST: CPT | Mod: 26 | Performed by: PATHOLOGY

## 2022-08-26 PROCEDURE — 88305 TISSUE EXAM BY PATHOLOGIST: CPT | Mod: TC,ORL | Performed by: INTERNAL MEDICINE

## 2022-08-29 ENCOUNTER — LAB REQUISITION (OUTPATIENT)
Dept: LAB | Facility: CLINIC | Age: 47
End: 2022-08-29
Payer: COMMERCIAL

## 2022-08-29 DIAGNOSIS — K57.30 DIVERTICULOSIS OF LARGE INTESTINE WITHOUT PERFORATION OR ABSCESS WITHOUT BLEEDING: ICD-10-CM

## 2022-08-29 DIAGNOSIS — D12.8 BENIGN NEOPLASM OF RECTUM: ICD-10-CM

## 2022-08-29 DIAGNOSIS — Z12.11 ENCOUNTER FOR SCREENING FOR MALIGNANT NEOPLASM OF COLON: ICD-10-CM

## 2022-08-30 LAB
PATH REPORT.COMMENTS IMP SPEC: NORMAL
PATH REPORT.COMMENTS IMP SPEC: NORMAL
PATH REPORT.FINAL DX SPEC: NORMAL
PATH REPORT.GROSS SPEC: NORMAL
PATH REPORT.MICROSCOPIC SPEC OTHER STN: NORMAL
PATH REPORT.RELEVANT HX SPEC: NORMAL
PHOTO IMAGE: NORMAL

## 2022-10-10 ENCOUNTER — HEALTH MAINTENANCE LETTER (OUTPATIENT)
Age: 47
End: 2022-10-10

## 2023-03-25 ENCOUNTER — HEALTH MAINTENANCE LETTER (OUTPATIENT)
Age: 48
End: 2023-03-25

## 2023-04-18 ENCOUNTER — OFFICE VISIT (OUTPATIENT)
Dept: FAMILY MEDICINE | Facility: CLINIC | Age: 48
End: 2023-04-18
Payer: COMMERCIAL

## 2023-04-18 ENCOUNTER — NURSE TRIAGE (OUTPATIENT)
Dept: NURSING | Facility: CLINIC | Age: 48
End: 2023-04-18

## 2023-04-18 VITALS
BODY MASS INDEX: 26.28 KG/M2 | HEART RATE: 103 BPM | OXYGEN SATURATION: 99 % | RESPIRATION RATE: 28 BRPM | DIASTOLIC BLOOD PRESSURE: 82 MMHG | TEMPERATURE: 99.2 F | SYSTOLIC BLOOD PRESSURE: 132 MMHG | WEIGHT: 170 LBS

## 2023-04-18 DIAGNOSIS — U07.1 INFECTION DUE TO 2019 NOVEL CORONAVIRUS: Primary | ICD-10-CM

## 2023-04-18 PROCEDURE — 99213 OFFICE O/P EST LOW 20 MIN: CPT | Mod: CS | Performed by: PHYSICIAN ASSISTANT

## 2023-04-18 NOTE — PROGRESS NOTES
URGENT CARE VISIT:    SUBJECTIVE:   Janeth Badillo is a 47 year old female presenting with a chief complaint of runny nose, cough - productive, sore throat, body aches and fatigue.  Onset was 2 day(s) ago.   She denies the following symptoms: shortness of breath  Course of illness is same.    Treatment measures tried include None tried with no relief of symptoms.  Predisposing factors include tested positive for COVID at home.    PMH:   Past Medical History:   Diagnosis Date     Allergic state      Asthma      Cough, persistent     right sided rib pain due to cough     PVC's (premature ventricular contractions)      Allergies: Morphine and Phenergan fortis   Medications:   Current Outpatient Medications   Medication Sig Dispense Refill     albuterol (PROAIR HFA/PROVENTIL HFA/VENTOLIN HFA) 108 (90 Base) MCG/ACT inhaler Inhale 2 puffs into the lungs as needed       cetirizine (ZYRTEC) 10 MG tablet Take 10 mg by mouth       Coenzyme Q10 (CO Q 10 PO)        nirmatrelvir and ritonavir (PAXLOVID) 300 mg/100 mg therapy pack Take 3 tablets by mouth 2 times daily for 5 days (Take 2 Nirmatrelvir tablets and 1 Ritonavir tablet twice daily for 5 days) 30 tablet 0     ketoconazole (NIZORAL) 2 % external shampoo        verapamil ER (CALAN-SR) 120 MG CR tablet Take 120 mg by mouth       Social History:   Social History     Tobacco Use     Smoking status: Never     Smokeless tobacco: Never   Vaping Use     Vaping status: Not on file   Substance Use Topics     Alcohol use: Yes     Comment: 1-2/WEEK       ROS:  General: negative  Skin: negative  Eyes: negative  Ears/Nose/Throat: nasal congestion  Respiratory: Cough  Cardiovascular: negative  Gastrointestinal: negative  Genitourinary: negative  Musculoskeletal: negative  Neurologic: negative      OBJECTIVE:  /82 (BP Location: Right arm, Patient Position: Sitting, Cuff Size: Adult Regular)   Pulse 103   Temp 99.2  F (37.3  C) (Oral)   Resp 28   Wt 77.1 kg (170 lb)    SpO2 99%   BMI 26.28 kg/m    GENERAL APPEARANCE: healthy, alert and no distress  EYES: EOMI,  PERRL, conjunctiva clear  HENT: ear canals and TM's normal.  Nose and mouth without ulcers, erythema or lesions  NECK: supple, nontender, no lymphadenopathy  RESP: lungs clear to auscultation - no rales, rhonchi or wheezes  CV: regular rates and rhythm, normal S1 S2, no murmur noted  SKIN: no suspicious lesions or rashes        ASSESSMENT:    ICD-10-CM    1. Infection due to 2019 novel coronavirus  U07.1 nirmatrelvir and ritonavir (PAXLOVID) 300 mg/100 mg therapy pack          PLAN:  Patient Instructions   Patient was educated on the natural course of viral illness which typically lasts up to 10 days.  Take medication as prescribed. Side effects discussed. Conservative measures discussed including increased fluids, nasal saline irrigation (neti pot), warm steamy shower, salt water gargles, cough suppressants, expectorants (Mucinex), and analgesics (Tylenol and/or Ibuprofen). See your primary care provider if symptoms worsen or do not improve in 7 days. Seek emergency care if you develop fever over 104 or shortness of breath.    Patient verbalized understanding and is agreeable to plan. The patient was discharged ambulatory and in stable condition.    Edelmira Hernandez PA-C ....................  4/18/2023   10:35 AM

## 2023-04-18 NOTE — PATIENT INSTRUCTIONS
Patient was educated on the natural course of viral illness which typically lasts up to 10 days.  Take medication as prescribed. Side effects discussed. Conservative measures discussed including increased fluids, nasal saline irrigation (neti pot), warm steamy shower, salt water gargles, cough suppressants, expectorants (Mucinex), and analgesics (Tylenol and/or Ibuprofen). See your primary care provider if symptoms worsen or do not improve in 7 days. Seek emergency care if you develop fever over 104 or shortness of breath.

## 2023-04-18 NOTE — TELEPHONE ENCOUNTER
"Caller is pt's  (Shahid).  Not on Consent to Communicate.  Currently with pt.  Requested to speak w/pt directly.    Pt reports onset of covid illness symptoms 2.5 days ago.  Today's home covid test -> positive result.  No [place] provider.    COVID Positive/Requesting COVID treatment    Patient is positive for COVID and requesting treatment options.    Date of positive COVID test (PCR or at home)? 4/18/23  Current COVID symptoms: fever or chills, cough, shortness of breath or difficulty breathing, fatigue, muscle or body aches, headache, sore throat, congestion or runny nose and nausea or vomiting  Date COVID symptoms began: 4/15/23    Pt states \"I'm already at the [facility] walk[facility]in[facility]clinic.  Offered virtual visit option for purposes of seeking Paxlovid Rx, however pt states preference to seek eval there in-person instead.    No further action required for this encounter.    Luli CHILDRESS Health Nurse Advisor     Reason for Disposition    MILD difficulty breathing (e.g., minimal/no SOB at rest, SOB with walking, pulse <100)    [1] Fever > 100.0 F (37.8 C) AND [2] bedridden (e.g., nursing home patient, CVA, chronic illness, recovering from surgery)    Additional Information    Negative: SEVERE difficulty breathing (e.g., struggling for each breath, speaks in single words)    Negative: Difficult to awaken or acting confused (e.g., disoriented, slurred speech)    Negative: Bluish (or gray) lips or face now    Negative: Shock suspected (e.g., cold/pale/clammy skin, too weak to stand, low BP, rapid pulse)    Negative: Sounds like a life-threatening emergency to the triager    Negative: [1] Diagnosed or suspected COVID-19 AND [2] symptoms lasting 3 or more weeks    Negative: [1] COVID-19 exposure AND [2] no symptoms    Negative: COVID-19 vaccine reaction suspected (e.g., fever, headache, muscle aches) occurring 1 to 3 days after getting vaccine    Negative: COVID-19 vaccine, questions about    Negative: " [1] Lives with someone known to have influenza (flu test positive) AND [2] flu-like symptoms (e.g., cough, runny nose, sore throat, SOB; with or without fever)    Negative: [1] Adult with possible COVID-19 symptoms AND [2] triager concerned about severity of symptoms or other causes    Negative: COVID-19 and breastfeeding, questions about    Negative: SEVERE or constant chest pain or pressure  (Exception: Mild central chest pain, present only when coughing.)    Negative: MODERATE difficulty breathing (e.g., speaks in phrases, SOB even at rest, pulse 100-120)    Negative: Headache and stiff neck (can't touch chin to chest)    Negative: Oxygen level (e.g., pulse oximetry) 90 percent or lower    Negative: Chest pain or pressure  (Exception: MILD central chest pain, present only when coughing)    Negative: Patient sounds very sick or weak to the triager    Protocols used: CORONAVIRUS (COVID-19) DIAGNOSED OR [id number]

## 2023-09-15 ENCOUNTER — ANCILLARY PROCEDURE (OUTPATIENT)
Dept: MAMMOGRAPHY | Facility: CLINIC | Age: 48
End: 2023-09-15
Payer: COMMERCIAL

## 2023-09-15 DIAGNOSIS — Z12.31 VISIT FOR SCREENING MAMMOGRAM: ICD-10-CM

## 2023-09-15 PROCEDURE — 77067 SCR MAMMO BI INCL CAD: CPT

## 2023-11-17 ENCOUNTER — OFFICE VISIT (OUTPATIENT)
Dept: AUDIOLOGY | Facility: CLINIC | Age: 48
End: 2023-11-17
Payer: COMMERCIAL

## 2023-11-17 ENCOUNTER — OFFICE VISIT (OUTPATIENT)
Dept: OTOLARYNGOLOGY | Facility: CLINIC | Age: 48
End: 2023-11-17
Payer: COMMERCIAL

## 2023-11-17 DIAGNOSIS — H93.8X3 PLUGGED FEELING IN EAR, BILATERAL: Primary | ICD-10-CM

## 2023-11-17 DIAGNOSIS — J01.91 ACUTE RECURRENT SINUSITIS, UNSPECIFIED LOCATION: ICD-10-CM

## 2023-11-17 DIAGNOSIS — H61.23 BILATERAL IMPACTED CERUMEN: ICD-10-CM

## 2023-11-17 DIAGNOSIS — R09.81 CONGESTION OF PARANASAL SINUS: ICD-10-CM

## 2023-11-17 DIAGNOSIS — Z01.10 NORMAL HEARING NOTED ON EXAMINATION: Primary | ICD-10-CM

## 2023-11-17 DIAGNOSIS — J30.2 SEASONAL ALLERGIC RHINITIS, UNSPECIFIED TRIGGER: ICD-10-CM

## 2023-11-17 PROCEDURE — 92550 TYMPANOMETRY & REFLEX THRESH: CPT | Mod: 52 | Performed by: AUDIOLOGIST

## 2023-11-17 PROCEDURE — 92557 COMPREHENSIVE HEARING TEST: CPT | Performed by: AUDIOLOGIST

## 2023-11-17 PROCEDURE — 99203 OFFICE O/P NEW LOW 30 MIN: CPT | Performed by: OTOLARYNGOLOGY

## 2023-11-17 NOTE — PROGRESS NOTES
".CHIEF COMPLAINT: Patient presents with:  Ear Problem: Recurrent sinus infections, nasal congestion, head congestion, post nasal drip, dry cough,SNOT total score 47         HISTORY OF PRESENT ILLNESS    Vee was seen at the behest of PCP for \"plugged ears\".    Patient states she is here for recurrent sinus infections.  She experiences dry cough, sinus pressure and stuffiness.   She has 4-5 months of cough.  She undwerwent 3 courses of antibiotics.  Mostly she always get sick after she flies.  She has been tested for allergies and was told \"you shouldn't live in [place]\".   Pets = 1 cat at home.  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  She also states her sense of smell has been gone for a while.  She has been on immunotherapy (Dr. Barillas) in the past but was interrupted by the pandemic.     AUDIOLOGY NOTE:    HISTORY: Vee reports a hx of aural fullness and otalgia in both ears L>R. Reports constant ringing tinnitus in the L ear. Reports one  month ago she felt lightheaded and fainted. Reports she experienced vertigo and had trouble hearing when she was pregnant 16 years  ago. Reports her father had age-related hearing loss. Denies otorrhea, ear surgery, and noise exposure.  RESULTS: Otoscopy: non-occluding cerumen bilat. Tymps: normal eardrum mobility bilat. 1000 Hz Ipsi Reflexes: present bilat. Audio:  normal hearing in both ears. Word Rec: excellent bilat. SRTs in agreement with pure tones.  REC: F/U with ENT. Retest hearing as required for medical management.    Sino-Nasal Outcome Test (SNOT - 22)    1. Need to Blow Nose: (P) Mild or slight  2. Nasal Blockage: (P) Moderate  3. Sneezing: (P) Mild or slight  4. Runny Nose: (P) Very mild  5. Cough: (P) Mild or slight  6. Post-nasal discharge: (P) Very mild  7. Thick nasal discharge: (P) None  8. Ear fullness: (P) Moderate  9. Dizziness: (P) Very mild  10. Ear Pain: (P) Very mild  11. Facial pain/pressure: (P) Moderate  12. Decreased " Sense of Smell/Taste: (P) Severe  13. Difficulty falling asleep: (P) Moderate  14. Wake up at night: (P) Moderate  15. Lack of a good night's sleep: (P) Moderate  16. Wake up tired: (P) Moderate  17. Fatigue: (P) Moderate  18. Reduced Productivity: (P) Moderate  19. Reduced Concentration: (P) Moderate  20. Frustrated/restless/irritable: (P) None  21. Sad: (P) Mild or slight  22. Embarrassed: (P) Very mild    Total Score: (P) 47    COPYRIGHT 1996. [facility] [place], [place]        REVIEW OF SYSTEMS    Review of Systems as per HPI and PMHx, otherwise 10 system review system are negative.       ALLERGIES    Morphine and Promethazine hcl    CURRENT MEDICATIONS      Current Outpatient Medications:     albuterol (PROAIR HFA/PROVENTIL HFA/VENTOLIN HFA) 108 (90 Base) MCG/ACT inhaler, Inhale 2 puffs into the lungs as needed, Disp: , Rfl:     cetirizine (ZYRTEC) 10 MG tablet, Take 10 mg by mouth, Disp: , Rfl:     Coenzyme Q10 (CO Q 10 PO), , Disp: , Rfl:     ketoconazole (NIZORAL) 2 % external shampoo, , Disp: , Rfl:      PAST MEDICAL HISTORY    PAST MEDICAL HISTORY:   Past Medical History:   Diagnosis Date    Allergic state     Asthma     Cough, persistent     right sided rib pain due to cough    PVC's (premature ventricular contractions)        PAST SURGICAL HISTORY    PAST SURGICAL HISTORY:   Past Surgical History:   Procedure Laterality Date     SECTION       SECTION N/A 2014    Procedure:  SECTION;  Surgeon: Anita Blanc MD;  Location:  L+D    DILATION AND CURETTAGE, OPERATIVE HYSTEROSCOPY WITH MORCELLATOR, COMBINED N/A 2016    Procedure: COMBINED DILATION AND CURETTAGE, OPERATIVE HYSTEROSCOPY WITH MORCELLATOR;  Surgeon: Peterson Dickerson MD;  Location:  OR    EYE SURGERY      HC AMNIOCENTESIS DIAGNOSTIC  10/20/2014    HERNIA REPAIR      [facility]    LAPAROSCOPIC TUBAL DYE STUDY  2013    Procedure: LAPAROSCOPIC TUBAL DYE STUDY;  LAPAROSCOPIC  BILATERAL TUBAL DYE STUDY;  Surgeon: Shaji Guevara MD;  Location:  SD    LAPAROTOMY EXPLORATORY      for fertility       FAMILY  HISTORY    FAMILY HISTORY:   Family History   Problem Relation Age of Onset    Diabetes Brother     Breast Cancer Maternal Aunt     Breast Cancer Paternal Aunt         2 or 3 paternal aunts with Breast Cancer       SOCIAL HISTORY    SOCIAL HISTORY:   Social History     Tobacco Use    Smoking status: Never    Smokeless tobacco: Never   Substance Use Topics    Alcohol use: Yes     Comment: 1-2/WEEK        PHYSICAL EXAM    HEAD: Normal appearance and symmetry:  No cutaneous lesions.      NECK:  supple     EARS:    Right:   TM nl intact   LEFT:  TM nl intact    EYES:  EOMI    CN VII/XII:  intact     NOSE:     Dorsum:   straight  Septum:  midline  Mucosa:  moist        ORAL CAVITY/OROPHARYNX:     Lips:  Normal.  Tongue: normal, midline  Mucosa:   no lesions     NECK:  Trachea:  midline.              Thyroid:  normal              Adenopathy:  none        NEURO:   Alert and Oriented     GAIT AND STATION:  normal     RESPIRATORY:   Symmetry and Respiratory effort     PSYCH:  Normal mood and affect     SKIN:   warm and dry         IMPRESSION:    Encounter Diagnoses   Name Primary?    Normal hearing noted on examination Yes    Acute recurrent sinusitis, unspecified location     Congestion of paranasal sinus     Seasonal allergic rhinitis, unspecified trigger     Bilateral impacted cerumen           RECOMMENDATIONS:      Orders Placed This Encounter   Procedures    Remove Cerumen    CT Sinus w/o Contrast    Adult Allergy/Asthma  Referral     Results via [facility]

## 2023-11-17 NOTE — LETTER
"    11/17/2023         RE: Janeth Badillo  [address] [address]        Dear Colleague,    Thank you for referring your patient, Janeth Badillo, to the [facility]. Please see a copy of my visit note below.    .CHIEF COMPLAINT: Patient presents with:  Ear Problem: Recurrent sinus infections, nasal congestion, head congestion, post nasal drip, dry cough,SNOT total score 47         HISTORY OF PRESENT ILLNESS    Vee was seen at the behest of PCP for \"plugged ears\".    Patient states she is here for recurrent sinus infections.  She experiences dry cough, sinus pressure and stuffiness.   She has 4-5 months of cough.  She undwerwent 3 courses of antibiotics.  Mostly she always get sick after she flies.  She has been tested for allergies and was told \"you shouldn't live in [place]\".   Pets = 1 cat at home.  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  She also states her sense of smell has been gone for a while.  She has been on immunotherapy (Dr. Barillas) in the past but was interrupted by the pandemic.     AUDIOLOGY NOTE:    HISTORY: Vee reports a hx of aural fullness and otalgia in both ears L>R. Reports constant ringing tinnitus in the L ear. Reports one  month ago she felt lightheaded and fainted. Reports she experienced vertigo and had trouble hearing when she was pregnant 16 years  ago. Reports her father had age-related hearing loss. Denies otorrhea, ear surgery, and noise exposure.  RESULTS: Otoscopy: non-occluding cerumen bilat. Tymps: normal eardrum mobility bilat. 1000 Hz Ipsi Reflexes: present bilat. Audio:  normal hearing in both ears. Word Rec: excellent bilat. SRTs in agreement with pure tones.  REC: F/U with ENT. Retest hearing as required for medical management.    Sino-Nasal Outcome Test (SNOT - 22)    1. Need to Blow Nose: (P) Mild or slight  2. Nasal Blockage: (P) Moderate  3. Sneezing: (P) Mild or slight  4. Runny Nose: " (P) Very mild  5. Cough: (P) Mild or slight  6. Post-nasal discharge: (P) Very mild  7. Thick nasal discharge: (P) None  8. Ear fullness: (P) Moderate  9. Dizziness: (P) Very mild  10. Ear Pain: (P) Very mild  11. Facial pain/pressure: (P) Moderate  12. Decreased Sense of Smell/Taste: (P) Severe  13. Difficulty falling asleep: (P) Moderate  14. Wake up at night: (P) Moderate  15. Lack of a good night's sleep: (P) Moderate  16. Wake up tired: (P) Moderate  17. Fatigue: (P) Moderate  18. Reduced Productivity: (P) Moderate  19. Reduced Concentration: (P) Moderate  20. Frustrated/restless/irritable: (P) None  21. Sad: (P) Mild or slight  22. Embarrassed: (P) Very mild    Total Score: (P) 47    COPYRIGHT 1996. [facility] [place] [facility] [place], [place]        REVIEW OF SYSTEMS    Review of Systems as per HPI and PMHx, otherwise 10 system review system are negative.       ALLERGIES    Morphine and Promethazine hcl    CURRENT MEDICATIONS      Current Outpatient Medications:      albuterol (PROAIR HFA/PROVENTIL HFA/VENTOLIN HFA) 108 (90 Base) MCG/ACT inhaler, Inhale 2 puffs into the lungs as needed, Disp: , Rfl:      cetirizine (ZYRTEC) 10 MG tablet, Take 10 mg by mouth, Disp: , Rfl:      Coenzyme Q10 (CO Q 10 PO), , Disp: , Rfl:      ketoconazole (NIZORAL) 2 % external shampoo, , Disp: , Rfl:      PAST MEDICAL HISTORY    PAST MEDICAL HISTORY:   Past Medical History:   Diagnosis Date     Allergic state      Asthma      Cough, persistent     right sided rib pain due to cough     PVC's (premature ventricular contractions)        PAST SURGICAL HISTORY    PAST SURGICAL HISTORY:   Past Surgical History:   Procedure Laterality Date      SECTION        SECTION N/A 2014    Procedure:  SECTION;  Surgeon: Anita Blanc MD;  Location:  L+D     DILATION AND CURETTAGE, OPERATIVE HYSTEROSCOPY WITH MORCELLATOR, COMBINED N/A 2016    Procedure: COMBINED DILATION AND CURETTAGE, OPERATIVE  HYSTEROSCOPY WITH MORCELLATOR;  Surgeon: Peterson Dickerson MD;  Location:  OR     EYE SURGERY       HC AMNIOCENTESIS DIAGNOSTIC  10/20/2014     HERNIA REPAIR      [facility]     LAPAROSCOPIC TUBAL DYE STUDY  11/27/2013    Procedure: LAPAROSCOPIC TUBAL DYE STUDY;  LAPAROSCOPIC BILATERAL TUBAL DYE STUDY;  Surgeon: Shaji Guevara MD;  Location:  SD     LAPAROTOMY EXPLORATORY      for fertility       FAMILY  HISTORY    FAMILY HISTORY:   Family History   Problem Relation Age of Onset     Diabetes Brother      Breast Cancer Maternal Aunt      Breast Cancer Paternal Aunt         2 or 3 paternal aunts with Breast Cancer       SOCIAL HISTORY    SOCIAL HISTORY:   Social History     Tobacco Use     Smoking status: Never     Smokeless tobacco: Never   Substance Use Topics     Alcohol use: Yes     Comment: 1-2/WEEK        PHYSICAL EXAM    HEAD: Normal appearance and symmetry:  No cutaneous lesions.      NECK:  supple     EARS:    Right:   TM nl intact   LEFT:  TM nl intact    EYES:  EOMI    CN VII/XII:  intact     NOSE:     Dorsum:   straight  Septum:  midline  Mucosa:  moist        ORAL CAVITY/OROPHARYNX:     Lips:  Normal.  Tongue: normal, midline  Mucosa:   no lesions     NECK:  Trachea:  midline.              Thyroid:  normal              Adenopathy:  none        NEURO:   Alert and Oriented     GAIT AND STATION:  normal     RESPIRATORY:   Symmetry and Respiratory effort     PSYCH:  Normal mood and affect     SKIN:   warm and dry         IMPRESSION:    Encounter Diagnoses   Name Primary?     Normal hearing noted on examination Yes     Acute recurrent sinusitis, unspecified location      Congestion of paranasal sinus      Seasonal allergic rhinitis, unspecified trigger      Bilateral impacted cerumen           RECOMMENDATIONS:      Orders Placed This Encounter   Procedures     Remove Cerumen     CT Sinus w/o Contrast     Adult Allergy/Asthma  Referral     Results via myChart      Again, thank you for allowing me to  participate in the care of your patient.        Sincerely,        Vance Pete MD

## 2023-11-17 NOTE — NURSING NOTE
Sino-Nasal Outcome Test (SNOT - 22)    1. Need to Blow Nose: (P) Mild or slight  2. Nasal Blockage: (P) Moderate  3. Sneezing: (P) Mild or slight  4. Runny Nose: (P) Very mild  5. Cough: (P) Mild or slight  6. Post-nasal discharge: (P) Very mild  7. Thick nasal discharge: (P) None  8. Ear fullness: (P) Moderate  9. Dizziness: (P) Very mild  10. Ear Pain: (P) Very mild  11. Facial pain/pressure: (P) Moderate  12. Decreased Sense of Smell/Taste: (P) Severe  13. Difficulty falling asleep: (P) Moderate  14. Wake up at night: (P) Moderate  15. Lack of a good night's sleep: (P) Moderate  16. Wake up tired: (P) Moderate  17. Fatigue: (P) Moderate  18. Reduced Productivity: (P) Moderate  19. Reduced Concentration: (P) Moderate  20. Frustrated/restless/irritable: (P) None  21. Sad: (P) Mild or slight  22. Embarrassed: (P) Very mild    Total Score: (P) 47    COPYRIGHT 1996. [facility] [place]

## 2023-11-28 ENCOUNTER — HOSPITAL ENCOUNTER (OUTPATIENT)
Dept: CT IMAGING | Facility: HOSPITAL | Age: 48
Discharge: HOME | End: 2023-11-28
Attending: OTOLARYNGOLOGY | Admitting: OTOLARYNGOLOGY
Payer: COMMERCIAL

## 2023-11-28 DIAGNOSIS — R09.81 CONGESTION OF PARANASAL SINUS: ICD-10-CM

## 2023-11-28 PROCEDURE — 70486 CT MAXILLOFACIAL W/O DYE: CPT

## 2023-11-30 ASSESSMENT — ASTHMA QUESTIONNAIRES: ACT_TOTALSCORE: 25

## 2023-12-06 ENCOUNTER — OFFICE VISIT (OUTPATIENT)
Dept: FAMILY MEDICINE | Facility: CLINIC | Age: 48
End: 2023-12-06
Payer: COMMERCIAL

## 2023-12-06 VITALS
SYSTOLIC BLOOD PRESSURE: 139 MMHG | BODY MASS INDEX: 27.46 KG/M2 | RESPIRATION RATE: 16 BRPM | HEIGHT: 68 IN | OXYGEN SATURATION: 100 % | DIASTOLIC BLOOD PRESSURE: 79 MMHG | WEIGHT: 181.2 LBS | TEMPERATURE: 98.4 F | HEART RATE: 84 BPM

## 2023-12-06 DIAGNOSIS — D25.9 UTERINE LEIOMYOMA, UNSPECIFIED LOCATION: ICD-10-CM

## 2023-12-06 DIAGNOSIS — Z12.4 CERVICAL CANCER SCREENING: ICD-10-CM

## 2023-12-06 DIAGNOSIS — R55 VASOVAGAL SYNCOPE: ICD-10-CM

## 2023-12-06 DIAGNOSIS — N80.9 ENDOMETRIOSIS: ICD-10-CM

## 2023-12-06 DIAGNOSIS — F84.0 AUTISM: ICD-10-CM

## 2023-12-06 DIAGNOSIS — K08.119 LOSS OF TEETH DUE TO AN ACCIDENT, UNSPECIFIED EDENTULISM CLASS: ICD-10-CM

## 2023-12-06 DIAGNOSIS — Z12.11 SCREEN FOR COLON CANCER: ICD-10-CM

## 2023-12-06 DIAGNOSIS — I42.2 HYPERTROPHIC CARDIOMYOPATHY (H): Primary | ICD-10-CM

## 2023-12-06 DIAGNOSIS — Z11.59 NEED FOR HEPATITIS C SCREENING TEST: ICD-10-CM

## 2023-12-06 DIAGNOSIS — F90.0 ATTENTION DEFICIT HYPERACTIVITY DISORDER (ADHD), PREDOMINANTLY INATTENTIVE TYPE: ICD-10-CM

## 2023-12-06 DIAGNOSIS — F32.1 CURRENT MODERATE EPISODE OF MAJOR DEPRESSIVE DISORDER WITHOUT PRIOR EPISODE (H): ICD-10-CM

## 2023-12-06 DIAGNOSIS — N95.1 PERIMENOPAUSAL: ICD-10-CM

## 2023-12-06 PROBLEM — R00.2 PALPITATIONS: Status: ACTIVE | Noted: 2019-04-22

## 2023-12-06 PROBLEM — D64.9 ANEMIA: Status: ACTIVE | Noted: 2018-07-18

## 2023-12-06 PROBLEM — E04.9 ENLARGED THYROID: Status: ACTIVE | Noted: 2021-06-08

## 2023-12-06 PROBLEM — L63.9 ALOPECIA AREATA: Status: ACTIVE | Noted: 2021-06-08

## 2023-12-06 PROBLEM — G62.9 NEUROPATHY: Status: ACTIVE | Noted: 2021-06-08

## 2023-12-06 PROBLEM — R76.8 POSITIVE ANA (ANTINUCLEAR ANTIBODY): Chronic | Status: ACTIVE | Noted: 2021-08-03

## 2023-12-06 PROBLEM — D50.0 IRON DEFICIENCY ANEMIA DUE TO CHRONIC BLOOD LOSS: Chronic | Status: ACTIVE | Noted: 2018-07-31

## 2023-12-06 PROCEDURE — 99213 OFFICE O/P EST LOW 20 MIN: CPT | Performed by: FAMILY MEDICINE

## 2023-12-06 RX ORDER — MAGNESIUM GLYCINATE 100 MG
100 TABLET ORAL AT BEDTIME
COMMUNITY

## 2023-12-06 RX ORDER — BUPROPION HYDROCHLORIDE 150 MG/1
300 TABLET ORAL EVERY MORNING
COMMUNITY
Start: 2023-11-30 | End: 2024-07-17

## 2023-12-06 RX ORDER — OMEGA-3 FATTY ACIDS/FISH OIL 300-1000MG
1 CAPSULE ORAL DAILY
COMMUNITY

## 2023-12-06 RX ORDER — METOPROLOL SUCCINATE 25 MG/1
25 TABLET, EXTENDED RELEASE ORAL DAILY
COMMUNITY
End: 2024-04-17

## 2023-12-06 RX ORDER — AZELASTINE HYDROCHLORIDE 137 UG/1
1 SPRAY, METERED NASAL DAILY
COMMUNITY
Start: 2022-12-09

## 2023-12-06 NOTE — PROGRESS NOTES
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       MED REC REQUIRED  Post Medication Reconciliation Status:  Discharge medications reconciled, continue medications without change  BMI:   Estimated body mass index is 27.96 kg/m  as calculated from the following:    Height as of this encounter: 1.715 m (5' 7.5\").    Weight as of this encounter: 82.2 kg (181 lb 3.2 oz).   Weight management plan: Discussed healthy diet and exercise guidelines        Zaira Rivera MD  [facility]    Todd Baxter is a 48 year old, presenting for the following health issues:  [facility] F[facility]        12/6/2023     2:54 PM   Additional Questions   Roomed by stevo   Accompanied by self         12/6/2023     2:54 PM   Patient Reported Additional Medications   Patient reports taking the following new medications no       HPI       ED/UC Followup:    Facility:  [facility]   Date of visit: 09/26/2023  Reason for visit: Fainting   Current Status: still having aches, fall injury facial area and still having mouth, teeth pain.         Review of Systems   Constitutional, HEENT, cardiovascular, pulmonary, gi and gu systems are negative, except as " "otherwise noted.      Objective    /79 (BP Location: Left arm, Patient Position: Sitting, Cuff Size: Adult Regular)   Pulse 84   Temp 98.4  F (36.9  C) (Oral)   Resp 16   Ht 1.715 m (5' 7.5\")   Wt 82.2 kg (181 lb 3.2 oz)   LMP 11/12/2023 (Approximate)   SpO2 100%   BMI 27.96 kg/m    Body mass index is 27.96 kg/m .  Physical Exam   GENERAL: healthy, alert and no distress  NECK: no adenopathy, no asymmetry, masses, or scars and thyroid normal to palpation  RESP: lungs clear to auscultation - no rales, rhonchi or wheezes  CV: regular rate and rhythm, normal S1 S2, no S3 or S4, no murmur, click or rub, no peripheral edema and peripheral pulses strong  ABDOMEN: soft, nontender, no hepatosplenomegaly, no masses and bowel sounds normal  MS: no gross musculoskeletal defects noted, no edema  PSYCH: mentation appears normal, affect flat                "

## 2024-03-08 ENCOUNTER — TELEPHONE (OUTPATIENT)
Dept: FAMILY MEDICINE | Facility: CLINIC | Age: 49
End: 2024-03-08
Payer: COMMERCIAL

## 2024-03-08 NOTE — TELEPHONE ENCOUNTER
Patient Quality Outreach    Patient is due for the following:   Asthma  -  ACT needed  Colon Cancer Screening  Depression  -  PHQ-9 needed  Physical Preventive Adult Physical    Next Steps:   Schedule a Adult Preventative    Type of outreach:    Sent [employer] message.    Next Steps:  Reach out within 90 days via [employer].    Max number of attempts reached: Yes. Will try again in 90 days if patient still on fail list.    Questions for provider review:    None           Yaritza De La Fuente MA

## 2024-04-13 SDOH — HEALTH STABILITY: PHYSICAL HEALTH: ON AVERAGE, HOW MANY MINUTES DO YOU ENGAGE IN EXERCISE AT THIS LEVEL?: 40 MIN

## 2024-04-13 SDOH — HEALTH STABILITY: PHYSICAL HEALTH: ON AVERAGE, HOW MANY DAYS PER WEEK DO YOU ENGAGE IN MODERATE TO STRENUOUS EXERCISE (LIKE A BRISK WALK)?: 2 DAYS

## 2024-04-13 ASSESSMENT — SOCIAL DETERMINANTS OF HEALTH (SDOH): HOW OFTEN DO YOU GET TOGETHER WITH FRIENDS OR RELATIVES?: ONCE A WEEK

## 2024-04-17 ENCOUNTER — OFFICE VISIT (OUTPATIENT)
Dept: FAMILY MEDICINE | Facility: CLINIC | Age: 49
End: 2024-04-17
Attending: FAMILY MEDICINE
Payer: COMMERCIAL

## 2024-04-17 VITALS
HEART RATE: 105 BPM | SYSTOLIC BLOOD PRESSURE: 128 MMHG | WEIGHT: 181.8 LBS | OXYGEN SATURATION: 99 % | HEIGHT: 67 IN | RESPIRATION RATE: 22 BRPM | DIASTOLIC BLOOD PRESSURE: 83 MMHG | BODY MASS INDEX: 28.53 KG/M2 | TEMPERATURE: 98.9 F

## 2024-04-17 DIAGNOSIS — Z12.11 SCREEN FOR COLON CANCER: ICD-10-CM

## 2024-04-17 DIAGNOSIS — Z01.818 PREOP GENERAL PHYSICAL EXAM: Primary | ICD-10-CM

## 2024-04-17 DIAGNOSIS — R55 VASOVAGAL SYNCOPE: ICD-10-CM

## 2024-04-17 DIAGNOSIS — F90.0 ATTENTION DEFICIT HYPERACTIVITY DISORDER (ADHD), PREDOMINANTLY INATTENTIVE TYPE: ICD-10-CM

## 2024-04-17 DIAGNOSIS — N92.0 EXCESSIVE AND FREQUENT MENSTRUATION: ICD-10-CM

## 2024-04-17 DIAGNOSIS — I42.2 HYPERTROPHIC CARDIOMYOPATHY (H): ICD-10-CM

## 2024-04-17 LAB
ERYTHROCYTE [DISTWIDTH] IN BLOOD BY AUTOMATED COUNT: 12.8 % (ref 10–15)
HCT VFR BLD AUTO: 39.7 % (ref 35–47)
HGB BLD-MCNC: 13.2 G/DL (ref 11.7–15.7)
MCH RBC QN AUTO: 29.6 PG (ref 26.5–33)
MCHC RBC AUTO-ENTMCNC: 33.2 G/DL (ref 31.5–36.5)
MCV RBC AUTO: 89 FL (ref 78–100)
PLATELET # BLD AUTO: 230 10E3/UL (ref 150–450)
RBC # BLD AUTO: 4.46 10E6/UL (ref 3.8–5.2)
WBC # BLD AUTO: 5 10E3/UL (ref 4–11)

## 2024-04-17 PROCEDURE — 80048 BASIC METABOLIC PNL TOTAL CA: CPT | Performed by: FAMILY MEDICINE

## 2024-04-17 PROCEDURE — 93010 ELECTROCARDIOGRAM REPORT: CPT | Performed by: FAMILY MEDICINE

## 2024-04-17 PROCEDURE — 99214 OFFICE O/P EST MOD 30 MIN: CPT | Mod: 25 | Performed by: FAMILY MEDICINE

## 2024-04-17 PROCEDURE — 36415 COLL VENOUS BLD VENIPUNCTURE: CPT | Performed by: FAMILY MEDICINE

## 2024-04-17 PROCEDURE — 80061 LIPID PANEL: CPT | Performed by: FAMILY MEDICINE

## 2024-04-17 PROCEDURE — 85027 COMPLETE CBC AUTOMATED: CPT | Performed by: FAMILY MEDICINE

## 2024-04-17 RX ORDER — METOPROLOL SUCCINATE 25 MG/1
25 TABLET, EXTENDED RELEASE ORAL DAILY
Qty: 10 TABLET | Refills: 0 | Status: SHIPPED | OUTPATIENT
Start: 2024-04-17 | End: 2024-07-17

## 2024-04-17 RX ORDER — CLINDAMYCIN PHOSPHATE 10 UG/ML
LOTION TOPICAL 2 TIMES DAILY
COMMUNITY
Start: 2024-02-12

## 2024-04-17 RX ORDER — AZELAIC ACID 0.15 G/G
GEL TOPICAL 2 TIMES DAILY
COMMUNITY
Start: 2024-02-12

## 2024-04-17 RX ORDER — TRETINOIN 0.25 MG/G
CREAM TOPICAL AT BEDTIME
COMMUNITY
Start: 2024-02-12

## 2024-04-17 SDOH — HEALTH STABILITY: PHYSICAL HEALTH: ON AVERAGE, HOW MANY DAYS PER WEEK DO YOU ENGAGE IN MODERATE TO STRENUOUS EXERCISE (LIKE A BRISK WALK)?: 2 DAYS

## 2024-04-17 SDOH — HEALTH STABILITY: PHYSICAL HEALTH: ON AVERAGE, HOW MANY MINUTES DO YOU ENGAGE IN EXERCISE AT THIS LEVEL?: 40 MIN

## 2024-04-17 ASSESSMENT — PATIENT HEALTH QUESTIONNAIRE - PHQ9
10. IF YOU CHECKED OFF ANY PROBLEMS, HOW DIFFICULT HAVE THESE PROBLEMS MADE IT FOR YOU TO DO YOUR WORK, TAKE CARE OF THINGS AT HOME, OR GET ALONG WITH OTHER PEOPLE: SOMEWHAT DIFFICULT
SUM OF ALL RESPONSES TO PHQ QUESTIONS 1-9: 11

## 2024-04-17 ASSESSMENT — SOCIAL DETERMINANTS OF HEALTH (SDOH): HOW OFTEN DO YOU GET TOGETHER WITH FRIENDS OR RELATIVES?: ONCE A WEEK

## 2024-04-17 ASSESSMENT — PAIN SCALES - GENERAL: PAINLEVEL: MILD PAIN (2)

## 2024-04-17 NOTE — PROGRESS NOTES
Preoperative Evaluation  [address] [address]  Phone: [phone]  Primary Provider: Magdalena Churchill  Pre-op Performing Provider: MAGDALENA CHURCHILL  Apr 17, 2024       Vee is a 48 year old, presenting for the following:  Pre-Op Exam        4/17/2024     1:54 PM   Additional Questions   Roomed by Diana         4/17/2024     1:54 PM   Patient Reported Additional Medications   Patient reports taking the following new medications none     Surgical Information  Surgery/Procedure: ROBOTIC XI ASSISTED LAPAROSCOPIC TOTAL HYSTERECTOMY BILATERAL SALPINGECTOMY, EXCISION OF ENDOMETREOSIS, LYSIS OF ADHESIONS   Surgery Location: [facility]   Surgeon:   Guille Christensen   Surgery Date: 5/1/2024  Time of Surgery: 8:00am  Where patient plans to recover: At home with family  Fax number for surgical facility: [phone]    Assessment & Plan     The proposed surgical procedure is considered INTERMEDIATE risk.    Preop general physical exam  Cleared for surgery but recommended restarting metoprolol 5 days before surgery and can stop once surgery complete.  - Lipid panel reflex to direct LDL Non-fasting; Future  - EKG Performed in Clinic w/ Provider Reading Fee ([facility] Only)  - metoprolol succinate ER (TOPROL XL) 25 MG 24 hr tablet; Take 1 tablet (25 mg) by mouth daily Take the 5 days before surgery.  - Basic metabolic panel  (Ca, Cl, CO2, Creat, Gluc, K, Na, BUN); Future  - CBC with platelets; Future    Excessive and frequent menstruation  Indication for surgery  - CBC with platelets; Future    Hypertrophic cardiomyopathy (H)  Complication surgery but stable patient status  - Lipid panel reflex to direct LDL Non-fasting; Future  - EKG Performed in Clinic w/ Provider Reading Fee ([facility] Only)  - metoprolol succinate ER (TOPROL XL) 25 MG 24 hr tablet; Take 1 tablet (25 mg) by mouth daily Take the 5 days before surgery.  -  Basic metabolic panel  (Ca, Cl, CO2, Creat, Gluc, K, Na, BUN); Future    Attention deficit hyperactivity disorder (ADHD), predominantly inattentive type  Stable    Vasovagal syncope  No recent issues.      Possible Sleep Apnea: Monitor oxygen during procedure and awakening       4/17/2024     2:50 PM   STOP-Bang Total Score   Total Score 1   Risk Stratification 0 - 2: Low Risk for ROSANA           - No identified additional risk factors other than previously addressed    Antiplatelet or Anticoagulation Medication Instructions   - Patient is on no antiplatelet or anticoagulation medications.    Additional Medication Instructions  Patient is to take all scheduled medications on the day of surgery EXCEPT for modifications listed below:  Hold vitamins and supplements    Recommendation  APPROVAL GIVEN to proceed with proposed procedure, without further diagnostic evaluation.      Subjective       Via the Health Maintenance questionnaire, the patient has reported the following services have been completed -Colonscopy, this information has been sent to the abstraction team.  HPI related to upcoming procedure: excessive and frequent vaginal bleeding for years.  Evaluation showed fibroids and possible endometriosis.        4/17/2024     1:59 PM   Preop Questions   1. Have you ever had a heart attack or stroke? No   2. Have you ever had surgery on your heart or blood vessels, such as a stent placement, a coronary artery bypass, or surgery on an artery in your head, neck, heart, or legs? No   3. Do you have chest pain with activity? No   4. Do you have a history of  heart failure? No   5. Do you currently have a cold, bronchitis or symptoms of other infection? No   6. Do you have a cough, shortness of breath, or wheezing? No   7. Do you or anyone in your family have previous history of blood clots? YES - mother with strokes   8. Do you or does anyone in your family have a serious bleeding problem such as prolonged bleeding  following surgeries or cuts? No   9. Have you ever had problems with anemia or been told to take iron pills? YES - in the past   10. Have you had any abnormal blood loss such as black, tarry or bloody stools, or abnormal vaginal bleeding? No   11. Have you ever had a blood transfusion? No   12. Are you willing to have a blood transfusion if it is medically needed before, during, or after your surgery? Yes   13. Have you or any of your relatives ever had problems with anesthesia? No   14. Do you have sleep apnea, excessive snoring or daytime drowsiness? Yes - snoring   15. Do you have any artifical heart valves or other implanted medical devices like a pacemaker, defibrillator, or continuous glucose monitor? No   16. Do you have artificial joints? No   17. Are you allergic to latex? No   18. Is there any chance that you may be pregnant? No       Health Care Directive  Patient does not have a Health Care Directive or Living Will: Discussed advance care planning with patient; information given to patient to review.    Preoperative Review of    reviewed - no record of controlled substances prescribed.      Status of Chronic Conditions:  See problem list for active medical problems.  Problems all longstanding and stable, except as noted/documented.  See ROS for pertinent symptoms related to these conditions.    Patient Active Problem List    Diagnosis Date Noted    Hypertrophic cardiomyopathy (H) 12/06/2023     Priority: Medium     [place]      Autism 12/06/2023     Priority: Medium    Attention deficit hyperactivity disorder (ADHD), predominantly inattentive type 12/06/2023     Priority: Medium     Managed by psychiatry and psychology Alise at [facility].      Current moderate episode of major depressive disorder without prior episode (H) 12/06/2023     Priority: Medium    Loss of teeth due to an accident, unspecified edentulism class 12/06/2023     Priority: Medium    Perimenopausal 12/06/2023     Priority: Medium     Uterine leiomyoma, unspecified location 2023     Priority: Medium    Vasovagal syncope 2023     Priority: Medium    Positive BIPIN (antinuclear antibody) 2021     Priority: Medium    Alopecia areata 2021     Priority: Medium     Formatting of this note might be different from the original. See Media document. Skin bx negative for discoid LE. Treated with topical and intralesional CS.      Enlarged thyroid 2021     Priority: Medium     Formatting of this note might be different from the original. Will make sure US corresponds to what we noted on exam and make sure some other process is not the cause for her apparent thyroid enlargement.      Neuropathy 2021     Priority: Medium     Formatting of this note might be different from the original. Intermittent. In arms. Needs evaluation.      Palpitations 2019     Priority: Medium    Iron deficiency anemia due to chronic blood loss 2018     Priority: Medium    Anemia 2018     Priority: Medium     Formatting of this note might be different from the original. Due to heavy menstruation      Menorrhagia--AND HX OF ANEMIA 2018     Priority: Medium    Mild intermittent asthma without complication 2016     Priority: Medium    Endometrial polyp 2016     Priority: Medium     Endometrial polyp visualized on saline infusion sonogram. 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  (Mosaic (25%) gain of part of chromosome 7.)      Abnormal findings on  screening 10/20/2014     Priority: Medium     abnormal cell-free DNA screen (verifi) Monosomy 18 Detected  amniocentesis done 10/20/14      fetal VSD, antepartum 10/09/2014     Priority: Medium    Placental abnormality in third trimester 10/09/2014     Priority: Medium    Known or suspected fetal abnormality affecting management of mother 10/09/2014     Priority: Medium    Endometriosis 2013     Priority: Medium    CARDIOVASCULAR SCREENING; LDL GOAL LESS THAN 160 2012     Priority: Medium    Allergic rhinitis 2003     Priority: Medium     Formatting of this note might be different from the original. Rhinitis Allergic  NOS Formatting of this note might be different from the original. Rhinitis Allergic  NOS        Past Medical History:   Diagnosis Date    Allergic state     Asthma     Cough, persistent     right sided rib pain due to cough    Depressive disorder 10/1/23    PVC's (premature ventricular contractions)      Past Surgical History:   Procedure Laterality Date    ABDOMEN SURGERY       SECTION       SECTION N/A 2014    Procedure:  SECTION;  Surgeon: Anita Blanc MD;  Location:  L+D    COLONOSCOPY  22    DILATION AND CURETTAGE, OPERATIVE HYSTEROSCOPY WITH MORCELLATOR, COMBINED N/A 2016    Procedure: COMBINED DILATION AND CURETTAGE, OPERATIVE HYSTEROSCOPY WITH MORCELLATOR;  Surgeon: Peterson Dickerson MD;  Location:  OR    EYE SURGERY      Retinal Hole repair    HERNIA REPAIR      RIH    LAPAROSCOPIC ABLATION ENDOMETRIOSIS      x 5    LAPAROSCOPIC TUBAL DYE STUDY  2013    Procedure: LAPAROSCOPIC TUBAL DYE STUDY;  LAPAROSCOPIC BILATERAL TUBAL DYE STUDY;  Surgeon: Shaji Geuvara MD;  Location: [facility]    LAPAROTOMY EXPLORATORY      for [facility]  [facility]2014     Current Outpatient Medications   Medication Sig Dispense Refill    albuterol (PROAIR HFA/PROVENTIL HFA/VENTOLIN HFA) 108 (90 Base) MCG/ACT inhaler Inhale 2 puffs into the lungs as needed      Azelastine HCl 137 MCG/SPRAY SOLN Spray 1 spray into both nostrils daily      buPROPion (WELLBUTRIN XL) 150 MG 24 hr tablet Take 150 mg by mouth every morning      cetirizine (ZYRTEC) 10 MG tablet Take 10 mg by mouth      Coenzyme Q10 (CO Q 10 PO)       ketoconazole (NIZORAL) 2 % external shampoo       Magnesium Bisglycinate (MAG GLYCINATE) 100 MG TABS Take 100 mg by mouth at bedtime      omega 3 1000 MG CAPS Take 1 g by mouth daily      metoprolol succinate ER (TOPROL XL) 25 MG 24 hr tablet Take 25 mg by mouth daily         Allergies   Allergen Reactions    Morphine Itching    Promethazine Hcl      Extreme anxiety        Social History     Tobacco Use    Smoking status: Never    Smokeless tobacco: Never   Substance Use Topics    Alcohol use: Yes     Comment: 1 drink a week if that       History   Drug Use No         Review of Systems    Review of Systems  Constitutional, neuro, ENT, endocrine, pulmonary, cardiac, gastrointestinal, genitourinary, musculoskeletal, integument and psychiatric systems are negative, except as otherwise noted.    Objective    /83 (BP Location: Left arm, Patient Position: Sitting, Cuff Size: Adult Regular)   Pulse 105   Temp 98.9  F (37.2  C) (Oral)   Resp 22   Ht 1.706 m (5' 7.17\")   Wt 82.5 kg (181 lb 12.8 oz)   LMP 04/10/2024 (Exact Date)   SpO2 99%   BMI 28.33 kg/m     Estimated body mass index is 28.33 kg/m  as calculated from the following:    Height as of this encounter: 1.706 m (5' 7.17\").    Weight as of this encounter: 82.5 kg (181 lb 12.8 oz).  Physical Exam  GENERAL: alert and no distress  EYES: Eyes grossly normal to inspection, PERRL and conjunctivae and sclerae normal  HENT: ear canals and TM's normal, nose and mouth without ulcers or " "lesions  NECK: no adenopathy, no asymmetry, masses, or scars  RESP: lungs clear to auscultation - no rales, rhonchi or wheezes  CV: regular rate and rhythm, normal S1 S2, no S3 or S4, no murmur, click or rub, no peripheral edema  ABDOMEN: enlarged uterus  MS: no gross musculoskeletal defects noted, no edema  NEURO: Normal strength and tone, mentation intact and speech normal  PSYCH: mentation appears normal, inattentive, and affect flat    No results for input(s): \"HGB\", \"PLT\", \"INR\", \"NA\", \"POTASSIUM\", \"CR\", \"A1C\" in the last 68033 hours.     Diagnostics  Labs pending at this time.  Results will be reviewed when available.   EKG: Normal Sinus Rhythm, LVH without acute ST changes.    Revised Cardiac Risk Index (RCRI)  The patient has the following serious cardiovascular risks for perioperative complications:   - No serious cardiac risks = 0 points     RCRI Interpretation: 0 points: Class I (very low risk - 0.4% complication rate)         Signed Electronically by: Zaira Rivera MD  Copy of this evaluation report is provided to requesting physician.         Answers submitted by the patient for this visit:  Patient Health Questionnaire (Submitted on 4/17/2024)  If you checked off any problems, how difficult have these problems made it for you to do your work, take care of things at home, or get along with other people?: Somewhat difficult  PHQ9 TOTAL SCORE: 11    "

## 2024-04-17 NOTE — PATIENT INSTRUCTIONS
Preparing for Your Surgery  Getting started  A nurse will call you to review your health history and instructions. They will give you an arrival time based on your scheduled surgery time. Please be ready to share:  Your doctor's clinic name and phone number  Your medical, surgical, and anesthesia history  A list of allergies and sensitivities  A list of medicines, including herbal treatments and over-the-counter drugs  Whether the patient has a legal guardian (ask how to send us the papers in advance)  Please tell us if you're pregnant--or if there's any chance you might be pregnant. Some surgeries may injure a fetus (unborn baby), so they require a pregnancy test. Surgeries that are safe for a fetus don't always need a test, and you can choose whether to have one.   If you have a child who's having surgery, please ask for a copy of Preparing for Your Child's Surgery.    Preparing for surgery  Within 10 to 30 days of surgery: Have a pre-op exam (sometimes called an H&P, or History and Physical). This can be done at a clinic or pre-operative center.  If you're having a , you may not need this exam. Talk to your care team.  At your pre-op exam, talk to your care team about all medicines you take. If you need to stop any medicines before surgery, ask when to start taking them again.  We do this for your safety. Many medicines can make you bleed too much during surgery. Some change how well surgery (anesthesia) drugs work.  Call your insurance company to let them know you're having surgery. (If you don't have insurance, call [phone].)  Call your clinic if there's any change in your health. This includes signs of a cold or flu (sore throat, runny nose, cough, rash, fever). It also includes a scrape or scratch near the surgery site.  If you have questions on the day of surgery, call your hospital or surgery center.  Eating and drinking guidelines  For your safety: Unless your surgeon tells you otherwise,  follow the guidelines below.  Eat and drink as usual until 8 hours before you arrive for surgery. After that, no food or milk.  Drink clear liquids until 2 hours before you arrive. These are liquids you can see through, like water, Gatorade, and Propel Water. They also include plain black coffee and tea (no cream or milk), candy, and breath mints. You can spit out gum when you arrive.  If you drink alcohol: Stop drinking it the night before surgery.  If your care team tells you to take medicine on the morning of surgery, it's okay to take it with a sip of water.  Preventing infection  Shower or bathe the night before and morning of your surgery. Follow the instructions your clinic gave you. (If no instructions, use regular soap.)  Don't shave or clip hair near your surgery site. We'll remove the hair if needed.  Don't smoke or vape the morning of surgery. You may chew nicotine gum up to 2 hours before surgery. A nicotine patch is okay.  Note: Some surgeries require you to completely quit smoking and nicotine. Check with your surgeon.  Your care team will make every effort to keep you safe from infection. We will:  Clean our hands often with soap and water (or an alcohol-based hand rub).  Clean the skin at your surgery site with a special soap that kills germs.  Give you a special gown to keep you warm. (Cold raises the risk of infection.)  Wear special hair covers, masks, gowns and gloves during surgery.  Give antibiotic medicine, if prescribed. Not all surgeries need antibiotics.  What to bring on the day of surgery  Photo ID and insurance card  Copy of your health care directive, if you have one  Glasses and hearing aids (bring cases)  You can't wear contacts during surgery  Inhaler and eye drops, if you use them (tell us about these when you arrive)  CPAP machine or breathing device, if you use them  A few personal items, if spending the night  If you have . . .  A pacemaker, ICD (cardiac defibrillator) or other  implant: Bring the ID card.  An implanted stimulator: Bring the remote control.  A legal guardian: Bring a copy of the certified (court-stamped) guardianship papers.  Please remove any jewelry, including body piercings. Leave jewelry and other valuables at home.  If you're going home the day of surgery  You must have a responsible adult drive you home. They should stay with you overnight as well.  If you don't have someone to stay with you, and you aren't safe to go home alone, we may keep you overnight. Insurance often won't pay for this.  After surgery  If it's hard to control your pain or you need more pain medicine, please call your surgeon's office.  Questions?   If you have any questions for your care team, list them here: _________________________________________________________________________________________________________________________________________________________________________ ____________________________________ ____________________________________ ____________________________________  For informational purposes only. Not to replace the advice of your health care provider. Copyright   2003, 2019 [place] [employer] [facility]. All rights reserved. Clinically reviewed by Caroline Evans MD. [employer] 930270 - REV 12/22.    How to Take Your Medication Before Surgery  - Take all of your medications before surgery as usual  - STOP taking all vitamins and herbal supplements 14 days before surgery.  - Restart the metoprolol for 5 days before your sugery

## 2024-04-19 LAB
ANION GAP SERPL CALCULATED.3IONS-SCNC: 12 MMOL/L (ref 7–15)
BUN SERPL-MCNC: 16.9 MG/DL (ref 6–20)
CALCIUM SERPL-MCNC: 10.6 MG/DL (ref 8.6–10)
CHLORIDE SERPL-SCNC: 102 MMOL/L (ref 98–107)
CHOLEST SERPL-MCNC: 220 MG/DL
CREAT SERPL-MCNC: 0.87 MG/DL (ref 0.51–0.95)
DEPRECATED HCO3 PLAS-SCNC: 24 MMOL/L (ref 22–29)
EGFRCR SERPLBLD CKD-EPI 2021: 81 ML/MIN/1.73M2
FASTING STATUS PATIENT QL REPORTED: YES
GLUCOSE SERPL-MCNC: 111 MG/DL (ref 70–99)
HDLC SERPL-MCNC: 47 MG/DL
LDLC SERPL CALC-MCNC: 139 MG/DL
NONHDLC SERPL-MCNC: 173 MG/DL
POTASSIUM SERPL-SCNC: 3.6 MMOL/L (ref 3.4–5.3)
SODIUM SERPL-SCNC: 138 MMOL/L (ref 135–145)
TRIGL SERPL-MCNC: 170 MG/DL

## 2024-04-23 ENCOUNTER — LAB REQUISITION (OUTPATIENT)
Dept: LAB | Facility: CLINIC | Age: 49
End: 2024-04-23

## 2024-04-23 DIAGNOSIS — Z76.89 PERSONS ENCOUNTERING HEALTH SERVICES IN OTHER SPECIFIED CIRCUMSTANCES: ICD-10-CM

## 2024-04-23 PROCEDURE — 88305 TISSUE EXAM BY PATHOLOGIST: CPT | Performed by: PATHOLOGY

## 2024-05-26 ENCOUNTER — HEALTH MAINTENANCE LETTER (OUTPATIENT)
Age: 49
End: 2024-05-26

## 2024-07-16 SDOH — HEALTH STABILITY: PHYSICAL HEALTH: ON AVERAGE, HOW MANY MINUTES DO YOU ENGAGE IN EXERCISE AT THIS LEVEL?: 30 MIN

## 2024-07-16 SDOH — HEALTH STABILITY: PHYSICAL HEALTH: ON AVERAGE, HOW MANY DAYS PER WEEK DO YOU ENGAGE IN MODERATE TO STRENUOUS EXERCISE (LIKE A BRISK WALK)?: 5 DAYS

## 2024-07-16 ASSESSMENT — PATIENT HEALTH QUESTIONNAIRE - PHQ9
10. IF YOU CHECKED OFF ANY PROBLEMS, HOW DIFFICULT HAVE THESE PROBLEMS MADE IT FOR YOU TO DO YOUR WORK, TAKE CARE OF THINGS AT HOME, OR GET ALONG WITH OTHER PEOPLE: SOMEWHAT DIFFICULT
SUM OF ALL RESPONSES TO PHQ QUESTIONS 1-9: 11
SUM OF ALL RESPONSES TO PHQ QUESTIONS 1-9: 11

## 2024-07-16 ASSESSMENT — ASTHMA QUESTIONNAIRES
ACT_TOTALSCORE: 25
ACT_TOTALSCORE: 25
QUESTION_3 LAST FOUR WEEKS HOW OFTEN DID YOUR ASTHMA SYMPTOMS (WHEEZING, COUGHING, SHORTNESS OF BREATH, CHEST TIGHTNESS OR PAIN) WAKE YOU UP AT NIGHT OR EARLIER THAN USUAL IN THE MORNING: NOT AT ALL
QUESTION_2 LAST FOUR WEEKS HOW OFTEN HAVE YOU HAD SHORTNESS OF BREATH: NOT AT ALL
QUESTION_4 LAST FOUR WEEKS HOW OFTEN HAVE YOU USED YOUR RESCUE INHALER OR NEBULIZER MEDICATION (SUCH AS ALBUTEROL): NOT AT ALL
QUESTION_1 LAST FOUR WEEKS HOW MUCH OF THE TIME DID YOUR ASTHMA KEEP YOU FROM GETTING AS MUCH DONE AT WORK, SCHOOL OR AT HOME: NONE OF THE TIME
QUESTION_5 LAST FOUR WEEKS HOW WOULD YOU RATE YOUR ASTHMA CONTROL: COMPLETELY CONTROLLED

## 2024-07-16 ASSESSMENT — SOCIAL DETERMINANTS OF HEALTH (SDOH): HOW OFTEN DO YOU GET TOGETHER WITH FRIENDS OR RELATIVES?: ONCE A WEEK

## 2024-07-17 ENCOUNTER — OFFICE VISIT (OUTPATIENT)
Dept: FAMILY MEDICINE | Facility: CLINIC | Age: 49
End: 2024-07-17
Payer: COMMERCIAL

## 2024-07-17 VITALS
DIASTOLIC BLOOD PRESSURE: 82 MMHG | OXYGEN SATURATION: 100 % | SYSTOLIC BLOOD PRESSURE: 136 MMHG | HEIGHT: 67 IN | RESPIRATION RATE: 16 BRPM | HEART RATE: 81 BPM | WEIGHT: 169 LBS | TEMPERATURE: 98.3 F | BODY MASS INDEX: 26.53 KG/M2

## 2024-07-17 DIAGNOSIS — Z00.00 ROUTINE GENERAL MEDICAL EXAMINATION AT A HEALTH CARE FACILITY: Primary | ICD-10-CM

## 2024-07-17 DIAGNOSIS — Z63.6 CAREGIVER STRESS: ICD-10-CM

## 2024-07-17 PROCEDURE — 90471 IMMUNIZATION ADMIN: CPT | Performed by: FAMILY MEDICINE

## 2024-07-17 PROCEDURE — 99396 PREV VISIT EST AGE 40-64: CPT | Mod: 25 | Performed by: FAMILY MEDICINE

## 2024-07-17 PROCEDURE — 90677 PCV20 VACCINE IM: CPT | Performed by: FAMILY MEDICINE

## 2024-07-17 RX ORDER — BUPROPION HYDROCHLORIDE 300 MG/1
300 TABLET ORAL EVERY MORNING
COMMUNITY

## 2024-07-17 RX ORDER — PROPRANOLOL HYDROCHLORIDE 10 MG/1
10 TABLET ORAL PRN
COMMUNITY

## 2024-07-17 SDOH — SOCIAL STABILITY - SOCIAL INSECURITY: DEPENDENT RELATIVE NEEDING CARE AT HOME: Z63.6

## 2024-07-17 ASSESSMENT — PAIN SCALES - GENERAL: PAINLEVEL: NO PAIN (0)

## 2024-07-17 NOTE — NURSING NOTE
Prior to immunization administration, verified patients identity using patient s name and date of birth. Please see Immunization Activity for additional information.     Screening Questionnaire for Adult Immunization    Are you sick today?   No   Do you have allergies to medications, food, a vaccine component or latex?   Yes   Have you ever had a serious reaction after receiving a vaccination?   No   Do you have a long-term health problem with heart, lung, kidney, or metabolic disease (e.g., diabetes), asthma, a blood disorder, no spleen, complement component deficiency, a cochlear implant, or a spinal fluid leak?  Are you on long-term aspirin therapy?   No   Do you have cancer, leukemia, HIV/AIDS, or any other immune system problem?   No   Do you have a parent, brother, or sister with an immune system problem?   No   In the past 3 months, have you taken medications that affect  your immune system, such as prednisone, other steroids, or anticancer drugs; drugs for the treatment of rheumatoid arthritis, Crohn s disease, or psoriasis; or have you had radiation treatments?   No   Have you had a seizure, or a brain or other nervous system problem?   No   During the past year, have you received a transfusion of blood or blood    products, or been given immune (gamma) globulin or antiviral drug?   No   For women: Are you pregnant or is there a chance you could become       pregnant during the next month?   No   Have you received any vaccinations in the past 4 weeks?   No     Immunization questionnaire answers were all negative.      Patient instructed to remain in clinic for 15 minutes afterwards, and to report any adverse reactions.     Screening performed by Yaritza De La Fuente MA on 7/17/2024 at 2:00 PM.

## 2024-07-17 NOTE — PROGRESS NOTES
"Preventive Care Visit  [facility]  Zaira ANNEL Rivera MD, Family Medicine  Jul 17, 2024      Assessment & Plan     Routine general medical examination at a health care facility  Routine preventive reviewed. Reassured about ASCVD    Caregiver stress  Referrals  - Primary Care - Care Coordination Referral; Future  - Adult Mental Health  Referral; Future            BMI  Estimated body mass index is 26.22 kg/m  as calculated from the following:    Height as of this encounter: 1.71 m (5' 7.32\").    Weight as of this encounter: 76.7 kg (169 lb).   Weight management plan: Discussed healthy diet and exercise guidelines    Counseling  Appropriate preventive services were addressed with this patient via screening, questionnaire, or discussion as appropriate for fall prevention, nutrition, physical activity, Tobacco-use cessation, weight loss and cognition.  Checklist reviewing preventive services available has been given to the patient.  Reviewed patient's diet, addressing concerns and/or questions.   The patient's PHQ-9 score is consistent with moderate depression. She was provided with information regarding depression.         Subjective   Vee is a 49 year old, presenting for the following:  Physical         Health Care Directive  Patient does not have a Health Care Directive or Living Will: Discussed advance care planning with patient; information given to patient to review.    HPI  Some stress related to father being in [place] and the care he is receiving.  She is his power of  and has safety concerns for herself when going to see him        7/16/2024   General Health   How would you rate your overall physical health? Good   Feel stress (tense, anxious, or unable to sleep) Very much      (!) STRESS CONCERN      7/16/2024   Nutrition   Three or more servings of calcium each day? Yes   Diet: Other   If other, please elaborate: Mostly pescatarian and plant-based   How " many servings of fruit and vegetables per day? 4 or more   How many sweetened beverages each day? 0-1            7/16/2024   Exercise   Days per week of moderate/strenous exercise 5 days   Average minutes spent exercising at this level 30 min            7/16/2024   Social Factors   Frequency of gathering with friends or relatives Once a week   Worry food won't last until get money to buy more No   Food not last or not have enough money for food? No   Do you have housing? (Housing is defined as stable permanent housing and does not include staying ouside in a car, in a tent, in an abandoned building, in an overnight shelter, or couch-surfing.) Yes   Are you worried about losing your housing? No   Lack of transportation? No   Unable to get utilities (heat,electricity)? No            7/16/2024   Fall Risk   Fallen 2 or more times in the past year? No   Trouble with walking or balance? No             7/16/2024   Dental   Dentist two times every year? Yes             Today's PHQ-9 Score:       7/16/2024     6:45 PM   PHQ-9 SCORE   PHQ-9 Total Score MyChart 11 (Moderate depression)   PHQ-9 Total Score 11         7/16/2024   Substance Use   Alcohol more than 3/day or more than 7/wk No   Do you use any other substances recreationally? No        Social History     Tobacco Use    Smoking status: Never    Smokeless tobacco: Never   Vaping Use    Vaping status: Never Used   Substance Use Topics    Alcohol use: Yes     Comment: 1 drink a week if that    Drug use: No             7/16/2024   Breast Cancer Screening   Family history of breast, colon, or ovarian cancer? Yes          7/16/2024   LAST FHS-7 RESULTS   1st degree relative breast or ovarian cancer No   Any relative bilateral breast cancer Unknown   Any male have breast cancer No   Any ONE woman have BOTH breast AND ovarian cancer No   Any woman with breast cancer before 50yrs Yes   2 or more relatives with breast AND/OR ovarian cancer Yes   2 or more relatives with  "breast AND/OR bowel cancer Yes                   7/16/2024   STI Screening   New sexual partner(s) since last STI/HIV test? No        History of abnormal Pap smear: Status post hysterectomy with removal of cervix and no history of CIN2 or greater or cervical cancer. Health Maintenance and Surgical History updated.        5/10/2021     8:24 AM   PAP / HPV   PAP-ABSTRACT See Scanned Document           This result is from an external source.     ASCVD Risk   The 10-year ASCVD risk score (Fredrick HSIEH, et al., 2019) is: 1.9%    Values used to calculate the score:      Age: 49 years      Sex: Female      Is Non- : No      Diabetic: No      Tobacco smoker: No      Systolic Blood Pressure: 136 mmHg      Is BP treated: No      HDL Cholesterol: 47 mg/dL      Total Cholesterol: 220 mg/dL            7/16/2024   Contraception/Family Planning   Questions about contraception or family planning No           Reviewed and updated as needed this visit by Provider   Tobacco  Allergies  Meds  Problems  Med Hx  Surg Hx  Fam Hx                  Review of Systems  Constitutional, neuro, ENT, endocrine, pulmonary, cardiac, gastrointestinal, genitourinary, musculoskeletal, integument and psychiatric systems are negative, except as otherwise noted.     Objective    Exam  /82 (BP Location: Left arm, Patient Position: Sitting, Cuff Size: Adult Regular)   Pulse 81   Temp 98.3  F (36.8  C) (Oral)   Resp 16   Ht 1.71 m (5' 7.32\")   Wt 76.7 kg (169 lb)   LMP 04/10/2024 (Exact Date)   SpO2 100%   BMI 26.22 kg/m     Estimated body mass index is 26.22 kg/m  as calculated from the following:    Height as of this encounter: 1.71 m (5' 7.32\").    Weight as of this encounter: 76.7 kg (169 lb).    Physical Exam  GENERAL: alert and no distress  EYES: Eyes grossly normal to inspection, PERRL and conjunctivae and sclerae normal  HENT: ear canals and TM's normal, nose and mouth without ulcers or " lesions  NECK: no adenopathy, no asymmetry, masses, or scars  RESP: lungs clear to auscultation - no rales, rhonchi or wheezes  CV: regular rate and rhythm, normal S1 S2, no S3 or S4, no murmur, click or rub, no peripheral edema  ABDOMEN: soft, nontender, no hepatosplenomegaly, no masses and bowel sounds normal  MS: no gross musculoskeletal defects noted, no edema  SKIN: no suspicious lesions or rashes  NEURO: Normal strength and tone, mentation intact and speech normal  PSYCH: mentation appears normal and affect flat        Signed Electronically by: Zaira Rivera MD    Answers submitted by the patient for this visit:  Patient Health Questionnaire (Submitted on 7/16/2024)  If you checked off any problems, how difficult have these problems made it for you to do your work, take care of things at home, or get along with other people?: Somewhat difficult  PHQ9 TOTAL SCORE: 11

## 2024-07-17 NOTE — PATIENT INSTRUCTIONS
At [facility], we strive to deliver an exceptional experience to you, every time we see you. If you receive a survey, please let us know what we are doing well and/or what we could improve upon, as we do value your feedback.  If you have MyChart, you can expect to receive results automatically within 24 hours of their completion.  Your provider will send a note interpreting your results as well.   If you do not have MyChart, you should receive your results in about a week by mail.    Your care team:                            Family Medicine Internal Medicine   MD Jordin Castro, MD Zaira Clarke, MD Alexx Arriaga, MD Sophy Haynes, PAAbhilashC    Srikanth Lopez, MD Pediatrics   Sandhya Nicholson, MD Albertina Ramos, MD Ese Dyer, APRN CNP Tena Eduardo APRN CNP   Maria Fernanda Rodriguez, MD Cindy Dao, MD Bria Mojica, CNP     Papo Camilo, CNP Same-Day Provider (No follow-up visits)   DANISHA Delgado, DNP Christelle Hall, PA-C   DANISHA Manjarrez, FNP, BC GILMAR RiosC     Clinic hours: Monday - Thursday 7 am-6 pm; Fridays 7 am-5 pm.   Urgent care: Monday - Friday 10 am- 8 pm; Saturday and Sunday 9 am-5 pm.    Clinic: [phone]       [facility]: Monday - Thursday 8 am - 7 pm; Friday 8 am - 6 pm  [facility]: [phone]     Patient Education   Preventive Care Advice   This is general advice given by our system to help you stay healthy. However, your care team may have specific advice just for you. Please talk to your care team about your preventive care needs.  Nutrition  Eat 5 or more servings of fruits and vegetables each day.  Try wheat bread, brown rice and whole grain pasta (instead of white bread, rice, and pasta).  Get enough calcium and vitamin D. Check the label on foods and aim for 100% of the RDA (recommended daily allowance).  Lifestyle  Exercise at least 150  minutes each week  (30 minutes a day, 5 days a week).  Do muscle strengthening activities 2 days a week. These help control your weight and prevent disease.  No smoking.  Wear sunscreen to prevent skin cancer.  Have a dental exam and cleaning every 6 months.  Yearly exams  See your health care team every year to talk about:  Any changes in your health.  Any medicines your care team has prescribed.  Preventive care, family planning, and ways to prevent chronic diseases.  Shots (vaccines)   HPV shots (up to age 26), if you've never had them before.  Hepatitis B shots (up to age 59), if you've never had them before.  COVID-19 shot: Get this shot when it's due.  Flu shot: Get a flu shot every year.  Tetanus shot: Get a tetanus shot every 10 years.  Pneumococcal, hepatitis A, and RSV shots: Ask your care team if you need these based on your risk.  Shingles shot (for age 50 and up)  General health tests  Diabetes screening:  Starting at age 35, Get screened for diabetes at least every 3 years.  If you are younger than age 35, ask your care team if you should be screened for diabetes.  Cholesterol test: At age 39, start having a cholesterol test every 5 years, or more often if advised.  Bone density scan (DEXA): At age 50, ask your care team if you should have this scan for osteoporosis (brittle bones).  Hepatitis C: Get tested at least once in your life.  STIs (sexually transmitted infections)  Before age 24: Ask your care team if you should be screened for STIs.  After age 24: Get screened for STIs if you're at risk. You are at risk for STIs (including HIV) if:  You are sexually active with more than one person.  You don't use condoms every time.  You or a partner was diagnosed with a sexually transmitted infection.  If you are at risk for HIV, ask about PrEP medicine to prevent HIV.  Get tested for HIV at least once in your life, whether you are at risk for HIV or not.  Cancer screening tests  Cervical cancer screening:  If you have a cervix, begin getting regular cervical cancer screening tests starting at age 21.  Breast cancer scan (mammogram): If you've ever had breasts, begin having regular mammograms starting at age 40. This is a scan to check for breast cancer.  Colon cancer screening: It is important to start screening for colon cancer at age 45.  Have a colonoscopy test every 10 years (or more often if you're at risk) Or, ask your provider about stool tests like a FIT test every year or Cologuard test every 3 years.  To learn more about your testing options, visit:   .  For help making a decision, visit:   https://bit.ly/ph34226.  Prostate cancer screening test: If you have a prostate, ask your care team if a prostate cancer screening test (PSA) at age 55 is right for you.  Lung cancer screening: If you are a current or former smoker ages 50 to 80, ask your care team if ongoing lung cancer screenings are right for you.  For informational purposes only. Not to replace the advice of your health care provider. Copyright   2023 [facility] [employer]. All rights reserved. Clinically reviewed by the [facility]. [employer] 210034 - REV 01/24.  Learning About Stress  What is stress?     Stress is your body's response to a hard situation. Your body can have a physical, emotional, or mental response. Stress is a fact of life for most people, and it affects everyone differently. What causes stress for you may not be stressful for someone else.  A lot of things can cause stress. You may feel stress when you go on a job interview, take a test, or run a race. This kind of short-term stress is normal and even useful. It can help you if you need to work hard or react quickly. For example, stress can help you finish an important job on time.  Long-term stress is caused by ongoing stressful situations or events. Examples of long-term stress include long-term health problems, ongoing problems at work, or  conflicts in your family. Long-term stress can harm your health.  How does stress affect your health?  When you are stressed, your body responds as though you are in danger. It makes hormones that speed up your heart, make you breathe faster, and give you a burst of energy. This is called the fight-or-flight stress response. If the stress is over quickly, your body goes back to normal and no harm is done.  But if stress happens too often or lasts too long, it can have bad effects. Long-term stress can make you more likely to get sick, and it can make symptoms of some diseases worse. If you tense up when you are stressed, you may develop neck, shoulder, or low back pain. Stress is linked to high blood pressure and heart disease.  Stress also harms your emotional health. It can make you larios, tense, or depressed. Your relationships may suffer, and you may not do well at work or school.  What can you do to manage stress?  You can try these things to help manage stress:   Do something active. Exercise or activity can help reduce stress. Walking is a great way to get started. Even everyday activities such as housecleaning or yard work can help.  Try yoga or domonique chi. These techniques combine exercise and meditation. You may need some training at first to learn them.  Do something you enjoy. For example, listen to music or go to a movie. Practice your hobby or do volunteer work.  Meditate. This can help you relax, because you are not worrying about what happened before or what may happen in the future.  Do guided imagery. Imagine yourself in any setting that helps you feel calm. You can use online videos, books, or a teacher to guide you.  Do breathing exercises. For example:  From a standing position, bend forward from the waist with your knees slightly bent. Let your arms dangle close to the floor.  Breathe in slowly and deeply as you return to a standing position. Roll up slowly and lift your head last.  Hold your  "breath for just a few seconds in the standing position.  Breathe out slowly and bend forward from the waist.  Let your feelings out. Talk, laugh, cry, and express anger when you need to. Talking with supportive friends or family, a counselor, or a ruthie leader about your feelings is a healthy way to relieve stress. Avoid discussing your feelings with people who make you feel worse.  Write. It may help to write about things that are bothering you. This helps you find out how much stress you feel and what is causing it. When you know this, you can find better ways to cope.  What can you do to prevent stress?  You might try some of these things to help prevent stress:  Manage your time. This helps you find time to do the things you want and need to do.  Get enough sleep. Your body recovers from the stresses of the day while you are sleeping.  Get support. Your family, friends, and community can make a difference in how you experience stress.  Limit your news feed. Avoid or limit time on social media or news that may make you feel stressed.  Do something active. Exercise or activity can help reduce stress. Walking is a great way to get started.  Where can you learn more?  Go to https://www.SUPR.net/patiented  Enter N032 in the search box to learn more about \"Learning About Stress.\"  Current as of: October 24, 2023               Content Version: 14.0    5502-1816 [employer].   Care instructions adapted under license by your healthcare professional. If you have questions about a medical condition or this instruction, always ask your healthcare professional. [employer] disclaims any warranty or liability for your use of this information.      Learning About Depression Screening  What is depression screening?  Depression screening is a way to see if you have depression symptoms. It may be done by a doctor or counselor. It's often part of a routine checkup. That's because your mental health " "is just as important as your physical health.  Depression is a mental health condition that affects how you feel, think, and act. You may:  Have less energy.  Lose interest in your daily activities.  Feel sad and grouchy for a long time.  Depression is very common. It affects people of all ages.  Many things can lead to depression. Some people become depressed after they have a stroke or find out they have a major illness like cancer or heart disease. The death of a loved one or a breakup may lead to depression. It can run in families. Most experts believe that a combination of inherited genes and stressful life events can cause it.  What happens during screening?  You may be asked to fill out a form about your depression symptoms. You and the doctor will discuss your answers. The doctor may ask you more questions to learn more about how you think, act, and feel.  What happens after screening?  If you have symptoms of depression, your doctor will talk to you about your options.  Doctors usually treat depression with medicines or counseling. Often, combining the two works best. Many people don't get help because they think that they'll get over the depression on their own. But people with depression may not get better unless they get treatment.  The cause of depression is not well understood. There may be many factors involved. But if you have depression, it's not your fault.  A serious symptom of depression is thinking about death or suicide. If you or someone you care about talks about this or about feeling hopeless, get help right away.  It's important to know that depression can be treated. Medicine, counseling, and self-care may help.  Where can you learn more?  Go to https://www.[employer].net/patiented  Enter T185 in the search box to learn more about \"Learning About Depression Screening.\"  Current as of: June 24, 2023               Content Version: 14.0    8650-1334 [employer].   Care " instructions adapted under license by your healthcare professional. If you have questions about a medical condition or this instruction, always ask your healthcare professional. [employer] disclaims any warranty or liability for your use of this information.

## 2024-07-18 ENCOUNTER — PATIENT OUTREACH (OUTPATIENT)
Dept: CARE COORDINATION | Facility: CLINIC | Age: 49
End: 2024-07-18
Payer: COMMERCIAL

## 2024-08-30 ENCOUNTER — APPOINTMENT (OUTPATIENT)
Dept: NURSING | Facility: CLINIC | Age: 49
End: 2024-08-30
Payer: COMMERCIAL

## 2024-08-30 ENCOUNTER — PATIENT OUTREACH (OUTPATIENT)
Dept: CARE COORDINATION | Facility: CLINIC | Age: 49
End: 2024-08-30

## 2024-08-30 NOTE — PROGRESS NOTES
Clinic Care Coordination Contact  [facility]/Voicemail    Clinical Data: Care Coordinator Outreach    Outreach Documentation Number of Outreach Attempt   8/30/2024   2:05 PM 1       Left message on patient's voicemail with call back information and requested return call.    Plan: Care Coordinator will try to reach patient again in 3-5 business days.    ASHISH Bailey  Social Work Primary Care Clinic Care Coordinator  [facility]   [phone]  [email or web address]

## 2024-09-06 ENCOUNTER — PATIENT OUTREACH (OUTPATIENT)
Dept: CARE COORDINATION | Facility: CLINIC | Age: 49
End: 2024-09-06
Payer: COMMERCIAL

## 2024-09-06 NOTE — PROGRESS NOTES
Clinic Care Coordination Contact  [facility]/Voicemail    Clinical Data: Care Coordinator Outreach    Outreach Documentation Number of Outreach Attempt   8/30/2024   2:05 PM 1   9/6/2024   9:46 AM 2       Left message on patient's voicemail with call back information and requested return call.    Plan: Care Coordinator will send disenrollment letter with care coordinator contact information via mail. Care Coordinator will do no further outreaches at this time.    ASHISH Bailey  Social Work Primary Care Clinic Care Coordinator  [facility]   [phone]  [email or web address]

## 2024-09-06 NOTE — LETTER
[facility] CARE COORDINATION  [id number]  [place] [address]    September 12, 2024    Janeth Badillo  [address]  [place] [address]      Dear Vee,    Thank you for calling me back. Below are some resource you can look at to find additional resources for your father in [place]. At this time the Care Coordination team will disenroll you from the program. CC will make no further attempts to reach you, however this does not change your ability to continue receiving care from your providers at your primary care clinic. If you need additional support from a care coordinator in the future please contact ASHISH Bailey at [phone].    Here is a resource you can look at to begin a search for resources for your father:     Welcome to the [employer] , a public service of the [employer] connecting you to services for older adults and their families. You can also reach us at [phone].  [email or web address].gov/Public/Index.aspx    All of us at [facility] are invested in your health and are here to assist you in meeting your goals.     Sincerely,    ASHISH Bailey  Social Work Primary Care Clinic Care Coordinator  [facility]   [phone]  [email or web address]

## 2024-09-27 ENCOUNTER — ANCILLARY PROCEDURE (OUTPATIENT)
Dept: MAMMOGRAPHY | Facility: CLINIC | Age: 49
End: 2024-09-27
Attending: FAMILY MEDICINE
Payer: COMMERCIAL

## 2024-09-27 DIAGNOSIS — Z12.31 VISIT FOR SCREENING MAMMOGRAM: ICD-10-CM

## 2024-09-27 PROCEDURE — 77063 BREAST TOMOSYNTHESIS BI: CPT

## 2024-11-12 ENCOUNTER — TELEPHONE (OUTPATIENT)
Dept: FAMILY MEDICINE | Facility: CLINIC | Age: 49
End: 2024-11-12
Payer: COMMERCIAL

## 2024-11-12 NOTE — TELEPHONE ENCOUNTER
Patient Quality Outreach    Patient is due for the following:   Asthma  -  ACT needed  Depression  -  PHQ-9 needed      Topic Date Due    Flu Vaccine (1) 09/01/2024    COVID-19 Vaccine (7 - 2024-25 season) 09/01/2024    Zoster (Shingles) Vaccine (1 of 2) 05/24/2024       Action(s) Taken:   Schedule a office visit for update asthma     Type of outreach:    Sent [employer] message.    Questions for provider review:    None           Yaritza De La Fuente MA

## 2025-01-07 ENCOUNTER — TELEPHONE (OUTPATIENT)
Dept: FAMILY MEDICINE | Facility: CLINIC | Age: 50
End: 2025-01-07
Payer: COMMERCIAL

## 2025-01-07 NOTE — TELEPHONE ENCOUNTER
Patient Quality Outreach    Patient is due for the following:   Asthma  -  ACT needed  Depression  -  PHQ-9 needed    Action(s) Taken:   Patient was assigned appropriate questionnaire to complete    Type of outreach:    Sent [employer] message.    Questions for provider review:    None           Ulices Andrade MA

## 2025-05-06 ENCOUNTER — TELEPHONE (OUTPATIENT)
Dept: FAMILY MEDICINE | Facility: CLINIC | Age: 50
End: 2025-05-06
Payer: COMMERCIAL

## 2025-05-06 NOTE — TELEPHONE ENCOUNTER
Patient Quality Outreach    Patient is due for the following:   Asthma  -  ACT needed  Depression  -  PHQ-9 needed    Action(s) Taken:   Schedule a office visit for depression screen     Type of outreach:    Sent [employer] message.    Questions for provider review:    None         Yaritza De La Fuente MA  Chart routed to None.

## (undated) DEVICE — SOL WATER IRRIG 1000ML BOTTLE 2F7114

## (undated) DEVICE — LINEN TOWEL PACK X5 5464

## (undated) DEVICE — PACK TVT HYSTEROSCOPY SMA15HYFSE

## (undated) DEVICE — SOL NACL 0.9% INJ 1000ML BAG 2B1324X

## (undated) DEVICE — SUCTION CANISTER MEDIVAC LINER 3000ML W/LID 65651-530

## (undated) DEVICE — GLOVE PROTEXIS W/NEU-THERA 7.0  2D73TE70

## (undated) DEVICE — TUBING IRRIG TUR Y TYPE 96" LF 6543-01

## (undated) RX ORDER — PROPOFOL 10 MG/ML
INJECTION, EMULSION INTRAVENOUS
Status: DISPENSED
Start: 2018-05-16